# Patient Record
Sex: MALE | Employment: FULL TIME | URBAN - METROPOLITAN AREA
[De-identification: names, ages, dates, MRNs, and addresses within clinical notes are randomized per-mention and may not be internally consistent; named-entity substitution may affect disease eponyms.]

---

## 2017-02-23 ENCOUNTER — OFFICE VISIT (OUTPATIENT)
Dept: FAMILY MEDICINE | Facility: CLINIC | Age: 37
End: 2017-02-23
Payer: COMMERCIAL

## 2017-02-23 VITALS
DIASTOLIC BLOOD PRESSURE: 75 MMHG | BODY MASS INDEX: 32.18 KG/M2 | SYSTOLIC BLOOD PRESSURE: 116 MMHG | TEMPERATURE: 98.6 F | HEART RATE: 65 BPM | OXYGEN SATURATION: 98 % | WEIGHT: 205 LBS | HEIGHT: 67 IN

## 2017-02-23 DIAGNOSIS — R06.83 SNORING: ICD-10-CM

## 2017-02-23 DIAGNOSIS — Z13.6 CARDIOVASCULAR SCREENING; LDL GOAL LESS THAN 160: ICD-10-CM

## 2017-02-23 DIAGNOSIS — Z00.00 ENCOUNTER FOR ROUTINE ADULT HEALTH EXAMINATION WITHOUT ABNORMAL FINDINGS: Primary | ICD-10-CM

## 2017-02-23 DIAGNOSIS — Z83.3 FAMILY HISTORY OF DIABETES MELLITUS: ICD-10-CM

## 2017-02-23 DIAGNOSIS — N52.9 ERECTILE DYSFUNCTION, UNSPECIFIED ERECTILE DYSFUNCTION TYPE: ICD-10-CM

## 2017-02-23 PROBLEM — E66.811 OBESITY, CLASS I, BMI 30-34.9: Status: ACTIVE | Noted: 2017-02-23

## 2017-02-23 LAB
CHOLEST SERPL-MCNC: 184 MG/DL
GLUCOSE SERPL-MCNC: 97 MG/DL (ref 70–99)
HDLC SERPL-MCNC: 46 MG/DL
LDLC SERPL CALC-MCNC: 115 MG/DL
NONHDLC SERPL-MCNC: 138 MG/DL
TRIGL SERPL-MCNC: 114 MG/DL

## 2017-02-23 PROCEDURE — 82947 ASSAY GLUCOSE BLOOD QUANT: CPT | Performed by: FAMILY MEDICINE

## 2017-02-23 PROCEDURE — 99395 PREV VISIT EST AGE 18-39: CPT | Performed by: FAMILY MEDICINE

## 2017-02-23 PROCEDURE — 99212 OFFICE O/P EST SF 10 MIN: CPT | Mod: 25 | Performed by: FAMILY MEDICINE

## 2017-02-23 PROCEDURE — 80061 LIPID PANEL: CPT | Performed by: FAMILY MEDICINE

## 2017-02-23 PROCEDURE — 36415 COLL VENOUS BLD VENIPUNCTURE: CPT | Performed by: FAMILY MEDICINE

## 2017-02-23 RX ORDER — SILDENAFIL CITRATE 20 MG/1
20-100 TABLET ORAL DAILY PRN
Qty: 50 TABLET | Refills: 1 | Status: SHIPPED | OUTPATIENT
Start: 2017-02-23 | End: 2017-10-10

## 2017-02-23 ASSESSMENT — PAIN SCALES - GENERAL: PAINLEVEL: NO PAIN (0)

## 2017-02-23 NOTE — PATIENT INSTRUCTIONS
Preventive Health Recommendations  Male Ages 26 - 39    Yearly exam:             See your health care provider every year in order to  o   Review health changes.   o   Discuss preventive care.    o   Review your medicines if your doctor has prescribed any.    You should be tested each year for STDs (sexually transmitted diseases), if you re at risk.     After age 35, talk to your provider about cholesterol testing. If you are at risk for heart disease, have your cholesterol tested at least every 5 years.     If you are at risk for diabetes, you should have a diabetes test (fasting glucose).  Shots: Get a flu shot each year. Get a tetanus shot every 10 years.     Nutrition:    Eat at least 5 servings of fruits and vegetables daily.     Eat whole-grain bread, whole-wheat pasta and brown rice instead of white grains and rice.     Talk to your provider about Calcium and Vitamin D.     Lifestyle    Exercise for at least 150 minutes a week (30 minutes a day, 5 days a week). This will help you control your weight and prevent disease.     Limit alcohol to one drink per day.     No smoking.     Wear sunscreen to prevent skin cancer.     See your dentist every six months for an exam and cleaning.       ================================================================================  Normal Values   Blood pressure  <140/90 for most adults    <130/80 for some chronic diseases (ask your care team about yours)    BMI (body mass index)  18.5-25 kg/m2 (based on height and weight)     Thank you for visiting [facility]    Normal or non-critical lab and imaging results will be communicated to you by MyChart, letter or phone within 7 days.  If you do not hear from us within 10 days, please call the clinic. If you have a critical or abnormal lab result, we will notify you by phone as soon as possible.     If you have any questions regarding your visit please contact:     Team Comfort:   Clinic Hours Telephone  Number   Dr. Anshul Brock 7am-5pm  Monday - Friday [phone]  Rudy RN  Deidra Benitez RN   Pharmacy 8:00am-8pm Monday-Friday    9am-5pm Saturday-Sunday [phone]   Urgent Care 11am-9pm Monday-Friday        9am-5pm Saturday-Sunday [phone]     After hours, weekend or if you need to make an appointment with your primary provider please call [phone].   After Hours nurse advise: call [facility]: [phone]    Medication Refills:  Call your pharmacy and they will forward the refill to us. Please allow 3 business days for your refills to be completed.          Qué es la disfunción eréctil  Si usted tiene problemas para lograr o mantener rd erección, recuerde que hay muchas personas con el mismo problema. La disfunción eréctil consiste en la dificultad para lograr la erección o mantenerla ab el tiempo suficiente para realizar el acto sexual (coito). Kayley problema puede ocurrirle a hombres de cualquier edad, lisa los trastornos de lauren que pueden provocar disfunción eréctil son más comunes a medida que aumenta la edad. La mitad de los hombres mayores de 40 años tienen disfunción eréctil en algún momento.  Causas de la disfunción eréctil    La disfunción eréctil puede tener diversas causas. La mayoría de ellas son físicas, lisa algunas veces se trata de problemas emocionales. A menudo, el problema se debe a rd combinación de varias causas. Entre las causas de la disfunción eréctil se encuentran las siguientes:    Trastornos de lauren pietro diabetes o depresión    Fumar tabaco o marihuana    Beber demasiado alcohol    Efectos secundarios de medicamentos    Daños a los nervios o a los vasos sanguíneos    Trastornos emocionales pietro estrés o problemas en las relaciones personales  La disfunción eréctil tiene tratamiento  Existen medicamentos con receta para tratar la disfunción eréctil. Estos medicamentos resultan útiles  en muchos casos carolyn, según cuál sea la causa del problema, mathew vez no yaritza suficientes. En kate magdiel, es posible que haya otras alternativas de tratamiento, tales pietro instrumentos de ayuda para la erección o cirugía. Dalton proveedor de atención médica podrá darle más información acerca del tratamiento adecuado para usted. Además, se están investigando actualmente nuevos tratamientos. Independientemente del tratamiento que elija, manténgase en contacto con dalton médico. Si los síntomas continúan, es posible que el médico pueda modificar el tratamiento actual o intentar darin nuevo.    [id number] [employer]. [address] [place] [id number]. Todos los derechos reservados. Esta información no pretende sustituir la atención médica profesional. Sólo dalton médico puede diagnosticar y tratar un problema de lauren.        Medicamentos orales para la disfunción eréctil (DE)  Los medicamentos recetados suelen ser muy eficaces en tratar la DE. Carolyn los hombres con ciertos problemas de lauren o que estén tomando determinados medicamentos no deben usarlos. Además, todos los medicamentos pueden provocar efectos secundarios. Pregúntele a dalton médico si los medicamentos orales para la DE son adecuados para usted, así pietro los riesgos y beneficios de dichos medicamentos.  Tipos de medicamentos orales para la DE  En el braun existen alex tipos de medicamentos orales vendidos con receta médica. Cada darin de ellos aumenta el flujo de torsten al pene. Cuando se estimula el pene, el resultado es rd erección. Estos alex medicamentos son:    Citrato de sildenafilo (Viagra)    Tadalafilo (Cialis)    Clorohidrato de vardenafilo (Levitra)  Lo que no hacen los medicamentos orales para la DE  No curan la disfunción eréctil. Si ellen de parish el medicamento, usted seguirá teniendo dificultades para lograr rd erección.    No producen rd erección en ausencia de estimulación sexual.    No aumentan la libido ni resuelven ningún otro  problema sexual aparte de las dificultades para lograr rd erección. Tampoco resuelven ningún tipo de problemas de tipo psicológico, emocional o de relaciones de sendy.  Cómo parish de manera hector los medicamentos orales para la DE    No combine los medicamentos para la DE con otros tratamientos a menos que dalton médico le sugiera hacerlo.    No tome medicamentos para la DE con mayor frecuencia o en dosis más elevadas que lo indicado en la receta.    Mencione a dalton médico desiree antecedentes clínicos y todos los medicamentos que tome, incluyendo los albin vendidos sin receta, los suplementos y las hierbas medicinales.    Consulte con dalton médico sobre los efectos de combinar el alcohol con dalton medicamento.  Posibles efectos secundarios de los medicamentos orales para la DE    Dolor de laurence    Rubor facial    Moqueo o congestión nasal    Indigestión    Distorsión pasajera de la percepción visual de los colores    Pérdida repentina de la visión o del oído (raramente)  Riesgos de los medicamentos orales para la DE    Usted no debe parish medicamentos para la DE si lucia otros medicamentos que contengan nitratos. Esta combinación puede provocar un descenso de la presión arterial a niveles peligrosos. Los nitratos incluyen la nitroglicerina (un tratamiento para la angina) y los  poppers  (un droga recreacional inhalada). Si no está seguro acerca de si está tomando nitratos, pregúntele a dalton médico o farmacéutico.    Los medicamentos llamados alfabloqueantes pueden interactuar con los medicamentos para la DE y provocar un descenso repentino de la presión arterial. Los alfabloqueantes son un tratamiento común para los problemas de la próstata y la patricia presión arterial. Asegúrese de que dalton médico se entere si usted está tomando estos medicamentos.     Si tiene el corazón debilitado debido a un infarto o rd enfermedad cardíaca, y no ha tenido relaciones sexuales ab algún tiempo, reanudarlas podría aumentar el esfuerzo que  debe hacer dalton corazón. Antes de tener relaciones sexuales, consulte con dalton médico para averiguar si dalton corazón está lo suficientemente raji pietro para tolerarlas.    En raros casos podría presentarse rd pérdida repentina de la visión al parish medicamentos para la DE. Halsey puede estar relacionado con presión arterial patricia, diabetes y otros trastornos. Pregúntele a dalton médico si usted está en riesgo de cecille tipo de pérdida de visión.    En raras ocasiones, la erección podría durar demasiado tiempo. Halsey podría dañar severamente los vasos sanguíneos del pene. Si dalton erección dura más de 4 horas, vaya de inmediato a la kain de emergencias.     [id number] [employer]. [address] [place] [address]. Todos los derechos reservados. Esta información no pretende sustituir la atención médica profesional. Sólo dalton médico puede diagnosticar y tratar un problema de lauren.        Understanding Erectile Dysfunction  Erectile dysfunction (ED) is a problem getting an erection firm enough or keeping it long enough for intercourse. The problem can happen to any man at any age. But health problems that can lead to ED become more common as a man ages. Up to half of men over age 40 experience ED at some point.    Causes of ED  ED can have many causes. Most are physical. Some are emotional issues. Often, a combination of causes is involved. Causes of ED may include:    Medical conditions such as diabetes or depression    Smoking tobacco or marijuana    Drinking too much alcohol    Side effects of medications    Injury to nerves or blood vessels    Emotional issues such as stress or relationship problems  ED can be treated  Prescription medications for ED are available. They help many men who try them. Depending upon the cause of the ED, though, medications may not be enough. In these cases, other treatment options are available. These include erectile aids and surgery. Your health care provider can tell you more about  the treatment that is right for you. And new treatments for ED are being studied. No matter what the treatment you decide on, stay in touch with your doctor. If your symptoms persist, he or she may be able to adjust your current treatment or try something new.    3479-9788 [employer]. [address] [place] [address]. All rights reserved. This information is not intended as a substitute for professional medical care. Always follow your healthcare professional's instructions.        Oral Medications for Erectile Dysfunction  You can use prescription oral medications for ED. They often work well. But, like all medications, they can have side effects. Also, you can t use them if you have certain health problems or take certain other medications. Talk with your doctor about oral ED medication. Ask whether it is right for you.  Types of oral ED medications  There are three types of prescription oral ED medications. Each one increases blood flow to the penis. When the penis is stimulated, an erection results. The three types are:    Sildenafil citrate (Viagra)    Tadalafil (Cialis)    Vardenafil HCl (Levitra)  What oral ED medications don t do  There are some things ED medications can t do.    They don t cure the cause of your ED. Without the medication, you ll still have trouble getting an erection.    They can t produce an erection without sexual stimulation.    They won t increase sexual desire. They also won t solve any other sexual issues. Psychological, emotional, or relationship issues will not be fixed.  Taking oral ED medications safely    Do not combine ED medications with other treatments unless your doctor tells you to.    Don t take ED medications more often or in larger doses than prescribed.    Tell your doctor your health history. Mention all medications you take. This includes over-the-counter drugs, supplements, and herbs.    Ask your doctor about whether you can drink alcohol  while taking ED medication.  Possible side effects of oral ED medications    Headache    Facial flushing    Runny or stuffy nose    Indigestion    Distortion of your color vision for a short time    Sudden vision loss or hearing loss (rare)  Risks of oral ED medications    Do not take ED medications if you take medications containing nitrates. The combination may drop your blood pressure to a dangerous level. Nitrates include nitroglycerin (a drug for angina). They are also in  poppers,  an inhaled recreational drug. If you re not sure whether you re taking nitrates, ask your doctor or pharmacist.    Medications called alpha-blockers can interact with ED medications. They can cause a sudden drop in blood pressure. Alpha-blockers are a common treatment for prostate problems. They also treat high blood pressure. Be sure your doctor knows if you take these medications.    If you ve had a heart attack or have heart disease and you have not had sex for a while, talk to your doctor. Having sex again can put extra strain on your heart. Your doctor can confirm that your heart is healthy enough for sex.    It is rare, but some men taking ED medications have had sudden vision loss. This may be more likely if other health problems are present. These include high blood pressure and diabetes. Ask your doctor if you are at risk for this type of vision loss.    In rare cases, an erection may last too long. This can badly damage the blood vessels in your penis. If an erection lasts longer than 4 hours, go to the emergency room right away.        9965-5995 [employer]. [address] [place] [address], [place] [address]. All rights reserved. This information is not intended as a substitute for professional medical care. Always follow your healthcare professional's instructions.

## 2017-02-23 NOTE — PROGRESS NOTES
SUBJECTIVE:     CC: Edmund Marcos is an 36 year old male who presents for preventative health visit.     Healthy Habits:    Do you get at least three servings of calcium containing foods daily (dairy, green leafy vegetables, etc.)? yes    Amount of exercise or daily activities, outside of work: none    Problems taking medications regularly: not applicable    Medication side effects: No    Have you had an eye exam in the past two years? no    Do you see a dentist twice per year? Yes    Do you have sleep apnea, excessive snoring or daytime drowsiness? yes      Other concerns:  -For the past couple years, patient reports he has been snoring at night which makes him tired during the day. He notes he is interested in seeing someone about it.       Today's PHQ-2 Score:   PHQ-2 ( 1999 [employer]) 2/23/2017 10/18/2012   Q1: Little interest or pleasure in doing things 0 0   Q2: Feeling down, depressed or hopeless 0 0   PHQ-2 Score 0 0       Abuse: Current or Past(Physical, Sexual or Emotional)- NO  Do you feel safe in your environment - YES    Social History   Substance Use Topics     Smoking status: Never Smoker     Smokeless tobacco: Never Used     Alcohol use Yes      Comment: occasional, 1-2 beers     The patient does not drink >3 drinks per day nor >7 drinks per week.    Past medical, family, and social histories, medications, and allergies are reviewed and updated in Epic.     ROS:  C: NEGATIVE for fever, chills, change in weight  I: NEGATIVE for worrisome rashes, moles or lesions  E: NEGATIVE for vision changes or irritation  ENT: NEGATIVE for ear, mouth and throat problems  R: NEGATIVE for significant cough or SOB  CV: NEGATIVE for chest pain, palpitations or peripheral edema  GI: NEGATIVE for nausea, abdominal pain, heartburn, or change in bowel habits   male: negative for dysuria, hematuria, decreased urinary stream, urethral discharge. Patient reports erectile dysfunction for the past year. He notes  "difficulty keeping an erection.  M: NEGATIVE for significant arthralgias or myalgia  N: NEGATIVE for weakness, dizziness or paresthesias  P: NEGATIVE for changes in mood or affect      Any history above obtained by the Medical Assistant was reviewed by Dr. Anshul Garland MD, and edited when necessary.    This document serves as a record of the services and decisions personally performed and made by Dr. Garland. It was created on his behalf by Genevieve Hopson, a trained medical scribe. The creation of this document is based on the provider's statements to the medical scribe.  Genevieve Hopson February 23, 2017 10:59 AM   OBJECTIVE:     /75 (BP Location: Left arm, Patient Position: Chair, Cuff Size: Adult Large)  Pulse 65  Temp 98.6  F (37  C) (Oral)  Ht 5' 7\" (1.702 m)  Wt 205 lb (93 kg)  SpO2 98%  BMI 32.11 kg/m2  EXAM:  GENERAL: healthy, alert and no distress  EYES: Eyes grossly normal to inspection, PERRL and conjunctivae and sclerae normal  HENT: ear canals and TM's normal, nose and mouth without ulcers or lesions  NECK: no adenopathy, no asymmetry, masses, or scars and thyroid normal to palpation  RESP: lungs clear to auscultation - no rales, rhonchi or wheezes  CV: regular rate and rhythm, normal S1 S2, no S3 or S4, no murmur, click or rub, no peripheral edema and peripheral pulses strong  ABDOMEN: soft, nontender, no hepatosplenomegaly, no masses and bowel sounds normal   (male): normal male genitalia without lesions or urethral discharge, no hernia  MS: no gross musculoskeletal defects noted, no edema  SKIN: no suspicious lesions or rashes  NEURO: Normal strength and tone, mentation intact and speech normal, DTR's symmetrical, cranial nerves 2-12 intact   PSYCH: mentation appears normal, affect normal/bright    ASSESSMENT/PLAN:     (Z00.00) Encounter for routine adult health examination without abnormal findings  (primary encounter diagnosis)  Comment: Negative screening exam; up-to-date on preventive " "services. Asymptomatic STD screening offered and declined by the patient.  Plan: Lipid panel reflex to direct LDL, Glucose        Follow up in 1 year for ANDREW    (Z13.6) CARDIOVASCULAR SCREENING; LDL GOAL LESS THAN 160  Comment: fasting.   Plan: Lipid panel reflex to direct LDL        Monitor periodically     (Z83.3) Family history of diabetes mellitus  Comment: mother and sister. Fasting.   Plan: Glucose, JUST IN CASE            (R06.83) Snoring  Comment: with his EDS, he likely has LOUIS  Plan: SLEEP EVALUATION & MANAGEMENT REFERRAL - ADULT            (N52.9) Erectile dysfunction, unspecified erectile dysfunction type  Comment:   Plan: sildenafil (REVATIO/VIAGRA) 20 MG tablet        Handouts provided. Follow up prn.       COUNSELING:  Reviewed preventive health counseling, as reflected in patient instructions       Regular exercise       Dental care       reports that he has never smoked. He has never used smokeless tobacco.    Estimated body mass index is 32.11 kg/(m^2) as calculated from the following:    Height as of this encounter: 5' 7\" (1.702 m).    Weight as of this encounter: 205 lb (93 kg).   Weight management plan: Discussed healthy diet and exercise guidelines and patient will follow up in 12 months in clinic to re-evaluate. Patient reports he rides his bike or occasionally goes to the gym.      Counseling Resources:  ATP IV Guidelines  Pooled Cohorts Equation Calculator  FRAX Risk Assessment  ICSI Preventive Guidelines  Dietary Guidelines for Americans, 2010  USDA's MyPlate  ASA Prophylaxis  Lung CA Screening      The information in this document, created by the medical scribe for me, accurately reflects the services I personally performed and the decisions made by me. I have reviewed and approved this document for accuracy prior to leaving the patient care area.  Anshul Garland MD    "

## 2017-02-23 NOTE — MR AVS SNAPSHOT
After Visit Summary   2/23/2017    Edmund Marcos    MRN: [id number]           Patient Information     Date Of Birth          1980        Visit Information        Provider Department      2/23/2017 11:00 AM Anshul Garland MD [facility]        Today's Diagnoses     Encounter for routine adult health examination without abnormal findings    -  1    CARDIOVASCULAR SCREENING; LDL GOAL LESS THAN 160        Family history of diabetes mellitus        Snoring        Erectile dysfunction, unspecified erectile dysfunction type          Care Instructions      Preventive Health Recommendations  Male Ages 26 - 39    Yearly exam:             See your health care provider every year in order to  o   Review health changes.   o   Discuss preventive care.    o   Review your medicines if your doctor has prescribed any.    You should be tested each year for STDs (sexually transmitted diseases), if you re at risk.     After age 35, talk to your provider about cholesterol testing. If you are at risk for heart disease, have your cholesterol tested at least every 5 years.     If you are at risk for diabetes, you should have a diabetes test (fasting glucose).  Shots: Get a flu shot each year. Get a tetanus shot every 10 years.     Nutrition:    Eat at least 5 servings of fruits and vegetables daily.     Eat whole-grain bread, whole-wheat pasta and brown rice instead of white grains and rice.     Talk to your provider about Calcium and Vitamin D.     Lifestyle    Exercise for at least 150 minutes a week (30 minutes a day, 5 days a week). This will help you control your weight and prevent disease.     Limit alcohol to one drink per day.     No smoking.     Wear sunscreen to prevent skin cancer.     See your dentist every six months for an exam and cleaning.       ================================================================================  Normal Values   Blood pressure  <140/90 for most  adults    <130/80 for some chronic diseases (ask your care team about yours)    BMI (body mass index)  18.5-25 kg/m2 (based on height and weight)     Thank you for visiting [facility]    Normal or non-critical lab and imaging results will be communicated to you by MyChart, letter or phone within 7 days.  If you do not hear from us within 10 days, please call the clinic. If you have a critical or abnormal lab result, we will notify you by phone as soon as possible.     If you have any questions regarding your visit please contact:     Team Comfort:   Clinic Hours Telephone Number   Dr. Anshul Saucedo Dr. [place] 7am-5pm  Monday - Friday [phone]  Rudy Benitez RN   Pharmacy 8:00am-8pm Monday-Friday    9am-5pm Saturday-Sunday [phone]   Urgent Care 11am-9pm Monday-Friday        9am-5pm Saturday-Sunday [phone]     After hours, weekend or if you need to make an appointment with your primary provider please call [phone].   After Hours nurse advise: call [facility]: [phone]    Medication Refills:  Call your pharmacy and they will forward the refill to us. Please allow 3 business days for your refills to be completed.          Qué es la disfunción eréctil  Si usted tiene problemas para lograr o mantener rd erección, recuerde que hay muchas personas con el mismo problema. La disfunción eréctil consiste en la dificultad para lograr la erección o mantenerla ab el tiempo suficiente para realizar el acto sexual (coito). Kayley problema puede ocurrirle a hombres de cualquier edad, lisa los trastornos de lauren que pueden provocar disfunción eréctil son más comunes a medida que aumenta la edad. La mitad de los hombres mayores de 40 años tienen disfunción eréctil en algún momento.  Causas de la disfunción eréctil    La disfunción eréctil puede tener diversas causas. La mayoría de ellas son físicas, lisa  algunas veces se trata de problemas emocionales. A menudo, el problema se debe a rd combinación de varias causas. Entre las causas de la disfunción eréctil se encuentran las siguientes:    Trastornos de lauren pietro diabetes o depresión    Fumar tabaco o marihuana    Beber demasiado alcohol    Efectos secundarios de medicamentos    Daños a los nervios o a los vasos sanguíneos    Trastornos emocionales pietro estrés o problemas en las relaciones personales  La disfunción eréctil tiene tratamiento  Existen medicamentos con receta para tratar la disfunción eréctil. Estos medicamentos resultan útiles en muchos casos carolyn, según cuál sea la causa del problema, mathew vez no yaritza suficientes. En kate magdiel, es posible que haya otras alternativas de tratamiento, tales pietro instrumentos de ayuda para la erección o cirugía. Dalton proveedor de atención médica podrá darle más información acerca del tratamiento adecuado para usted. Además, se están investigando actualmente nuevos tratamientos. Independientemente del tratamiento que elija, manténgase en contacto con dalton médico. Si los síntomas continúan, es posible que el médico pueda modificar el tratamiento actual o intentar darin nuevo.    1493-6270 [employer]. [address] [place] [address]. Todos los derechos reservados. Esta información no pretende sustituir la atención médica profesional. Sólo dalton médico puede diagnosticar y tratar un problema de lauren.        Medicamentos orales para la disfunción eréctil (DE)  Los medicamentos recetados suelen ser muy eficaces en tratar la DE. Carolyn los hombres con ciertos problemas de lauren o que estén tomando determinados medicamentos no deben usarlos. Además, todos los medicamentos pueden provocar efectos secundarios. Pregúntele a dalton médico si los medicamentos orales para la DE son adecuados para usted, así pietro los riesgos y beneficios de dichos medicamentos.  Tipos de medicamentos orales para la DE  En el braun existen  alex tipos de medicamentos orales vendidos con receta médica. Cada darin de ellos aumenta el flujo de torsten al pene. Cuando se estimula el pene, el resultado es rd erección. Estos alex medicamentos son:    Citrato de sildenafilo (Viagra)    Tadalafilo (Cialis)    Clorohidrato de vardenafilo (Levitra)  Lo que no hacen los medicamentos orales para la DE  No curan la disfunción eréctil. Si ellen de parish el medicamento, usted seguirá teniendo dificultades para lograr rd erección.    No producen rd erección en ausencia de estimulación sexual.    No aumentan la libido ni resuelven ningún otro problema sexual aparte de las dificultades para lograr rd erección. Tampoco resuelven ningún tipo de problemas de tipo psicológico, emocional o de relaciones de sendy.  Cómo parish de manera hector los medicamentos orales para la DE    No combine los medicamentos para la DE con otros tratamientos a menos que dalton médico le sugiera hacerlo.    No tome medicamentos para la DE con mayor frecuencia o en dosis más elevadas que lo indicado en la receta.    Mencione a dalton médico desiree antecedentes clínicos y todos los medicamentos que tome, incluyendo los albin vendidos sin receta, los suplementos y las hierbas medicinales.    Consulte con dalton médico sobre los efectos de combinar el alcohol con dalton medicamento.  Posibles efectos secundarios de los medicamentos orales para la DE    Dolor de laurence    Rubor facial    Moqueo o congestión nasal    Indigestión    Distorsión pasajera de la percepción visual de los colores    Pérdida repentina de la visión o del oído (raramente)  Riesgos de los medicamentos orales para la DE    Usted no debe parish medicamentos para la DE si lucia otros medicamentos que contengan nitratos. Esta combinación puede provocar un descenso de la presión arterial a niveles peligrosos. Los nitratos incluyen la nitroglicerina (un tratamiento para la angina) y los  poppers  (un droga recreacional inhalada). Si no está seguro  acerca de si está tomando nitratos, pregúntele a dalton médico o farmacéutico.    Los medicamentos llamados alfabloqueantes pueden interactuar con los medicamentos para la DE y provocar un descenso repentino de la presión arterial. Los alfabloqueantes son un tratamiento común para los problemas de la próstata y la patricia presión arterial. Asegúrese de que dalton médico se entere si usted está tomando estos medicamentos.     Si tiene el corazón debilitado debido a un infarto o rd enfermedad cardíaca, y no ha tenido relaciones sexuales ab algún tiempo, reanudarlas podría aumentar el esfuerzo que debe hacer dalton corazón. Antes de tener relaciones sexuales, consulte con dalton médico para averiguar si dalton corazón está lo suficientemente raji pietro para tolerarlas.    En raros casos podría presentarse rd pérdida repentina de la visión al parish medicamentos para la DE. Wing puede estar relacionado con presión arterial patricia, diabetes y otros trastornos. Pregúntele a dalton médico si usted está en riesgo de cecille tipo de pérdida de visión.    En raras ocasiones, la erección podría durar demasiado tiempo. Wing podría dañar severamente los vasos sanguíneos del pene. Si dalton erección dura más de 4 horas, vaya de inmediato a la kain de emergencias.     [id number] [employer]. [address] [place] [address], [place] [address]. Todos los derechos reservados. Esta información no pretende sustituir la atención médica profesional. Sólo dalton médico puede diagnosticar y tratar un problema de lauren.        Understanding Erectile Dysfunction  Erectile dysfunction (ED) is a problem getting an erection firm enough or keeping it long enough for intercourse. The problem can happen to any man at any age. But health problems that can lead to ED become more common as a man ages. Up to half of men over age 40 experience ED at some point.    Causes of ED  ED can have many causes. Most are physical. Some are emotional issues. Often, a combination of causes is  involved. Causes of ED may include:    Medical conditions such as diabetes or depression    Smoking tobacco or marijuana    Drinking too much alcohol    Side effects of medications    Injury to nerves or blood vessels    Emotional issues such as stress or relationship problems  ED can be treated  Prescription medications for ED are available. They help many men who try them. Depending upon the cause of the ED, though, medications may not be enough. In these cases, other treatment options are available. These include erectile aids and surgery. Your health care provider can tell you more about the treatment that is right for you. And new treatments for ED are being studied. No matter what the treatment you decide on, stay in touch with your doctor. If your symptoms persist, he or she may be able to adjust your current treatment or try something new.    2708-8175 [employer]. [address] [place] [address], [place] [address]. All rights reserved. This information is not intended as a substitute for professional medical care. Always follow your healthcare professional's instructions.        Oral Medications for Erectile Dysfunction  You can use prescription oral medications for ED. They often work well. But, like all medications, they can have side effects. Also, you can t use them if you have certain health problems or take certain other medications. Talk with your doctor about oral ED medication. Ask whether it is right for you.  Types of oral ED medications  There are three types of prescription oral ED medications. Each one increases blood flow to the penis. When the penis is stimulated, an erection results. The three types are:    Sildenafil citrate (Viagra)    Tadalafil (Cialis)    Vardenafil HCl (Levitra)  What oral ED medications don t do  There are some things ED medications can t do.    They don t cure the cause of your ED. Without the medication, you ll still have trouble getting an erection.    They  can t produce an erection without sexual stimulation.    They won t increase sexual desire. They also won t solve any other sexual issues. Psychological, emotional, or relationship issues will not be fixed.  Taking oral ED medications safely    Do not combine ED medications with other treatments unless your doctor tells you to.    Don t take ED medications more often or in larger doses than prescribed.    Tell your doctor your health history. Mention all medications you take. This includes over-the-counter drugs, supplements, and herbs.    Ask your doctor about whether you can drink alcohol while taking ED medication.  Possible side effects of oral ED medications    Headache    Facial flushing    Runny or stuffy nose    Indigestion    Distortion of your color vision for a short time    Sudden vision loss or hearing loss (rare)  Risks of oral ED medications    Do not take ED medications if you take medications containing nitrates. The combination may drop your blood pressure to a dangerous level. Nitrates include nitroglycerin (a drug for angina). They are also in  poppers,  an inhaled recreational drug. If you re not sure whether you re taking nitrates, ask your doctor or pharmacist.    Medications called alpha-blockers can interact with ED medications. They can cause a sudden drop in blood pressure. Alpha-blockers are a common treatment for prostate problems. They also treat high blood pressure. Be sure your doctor knows if you take these medications.    If you ve had a heart attack or have heart disease and you have not had sex for a while, talk to your doctor. Having sex again can put extra strain on your heart. Your doctor can confirm that your heart is healthy enough for sex.    It is rare, but some men taking ED medications have had sudden vision loss. This may be more likely if other health problems are present. These include high blood pressure and diabetes. Ask your doctor if you are at risk for this type  of vision loss.    In rare cases, an erection may last too long. This can badly damage the blood vessels in your penis. If an erection lasts longer than 4 hours, go to the emergency room right away.        9970-2017 [employer]. [address] [place] [address]. All rights reserved. This information is not intended as a substitute for professional medical care. Always follow your healthcare professional's instructions.              Follow-ups after your visit        Additional Services     SLEEP EVALUATION & MANAGEMENT REFERRAL - ADULT       Please call [facility] at [phone] to schedule your appointment with the sleep specialist.     Please be aware that coverage of these services is subject to the terms and limitations of your health insurance plan.  Call member services at your health plan with any benefit or coverage questions.      Please bring the following to your appointment:    >>   List of current medications   >>   This referral request   >>   Any documents/labs given to you for this referral                  Follow-up notes from your care team     Return if symptoms worsen or fail to improve.      Future tests that were ordered for you today     Open Future Orders        Priority Expected Expires Ordered    SLEEP EVALUATION & MANAGEMENT REFERRAL - ADULT Routine  2/23/2018 2/23/2017            Who to contact     If you have questions or need follow up information about today's clinic visit or your schedule please contact [facility] directly at [phone].  Normal or non-critical lab and imaging results will be communicated to you by MyChart, letter or phone within 4 business days after the clinic has received the results. If you do not hear from us within 7 days, please contact the clinic through MyChart or phone. If you have a critical or abnormal lab result, we will notify you by phone as soon as possible.  Submit refill requests through  "MyChart or call your pharmacy and they will forward the refill request to us. Please allow 3 business days for your refill to be completed.          Additional Information About Your Visit        MyChart Information     [employer] lets you send messages to your doctor, view your test results, renew your prescriptions, schedule appointments and more. To sign up, go to [email or web address] . Click on \"Log in\" on the left side of the screen, which will take you to the Welcome page. Then click on \"Sign up Now\" on the right side of the page.     You will be asked to enter the access code listed below, as well as some personal information. Please follow the directions to create your username and password.     Your access code is: [email or web address]  Expires: 2017 11:50 AM     Your access code will  in 90 days. If you need help or a new code, please call your [facility] clinic or [phone].        Care EveryWhere ID     This is your Care EveryWhere ID. This could be used by other organizations to access your [facility] medical records  [id number]        Your Vitals Were     Pulse Temperature Height Pulse Oximetry BMI (Body Mass Index)       65 98.6  F (37  C) (Oral) 5' 7\" (1.702 m) 98% 32.11 kg/m2        Blood Pressure from Last 3 Encounters:   17 116/75   10/18/12 107/70    Weight from Last 3 Encounters:   17 205 lb (93 kg)   10/18/12 198 lb (89.8 kg)              We Performed the Following     Glucose     JUST IN CASE     Lipid panel reflex to direct LDL          Today's Medication Changes          These changes are accurate as of: 17 11:50 AM.  If you have any questions, ask your nurse or doctor.               Start taking these medicines.        Dose/Directions    sildenafil 20 MG tablet   Commonly known as:  REVATIO/VIAGRA   Used for:  Erectile dysfunction, unspecified erectile dysfunction type   Started by:  Anshul Garland MD        Dose:   mg   Take 1-5 tablets ( mg) by mouth " daily as needed , as directed, 1-3 hours before intimacy. Maximum 1 dose per 24 hours. Never use with nitroglycerin, terazosin or doxazosin.   Quantity:  50 tablet   Refills:  1            Where to get your medicines      Some of these will need a paper prescription and others can be bought over the counter.  Ask your nurse if you have questions.     Bring a paper prescription for each of these medications     sildenafil 20 MG tablet                Primary Care Provider    None Specified       No primary provider on file.        Thank you!     Thank you for choosing [facility]  for your care. Our goal is always to provide you with excellent care. Hearing back from our patients is one way we can continue to improve our services. Please take a few minutes to complete the written survey that you may receive in the mail after your visit with us. Thank you!             Your Updated Medication List - Protect others around you: Learn how to safely use, store and throw away your medicines at www.disposemymeds.org.          This list is accurate as of: 2/23/17 11:50 AM.  Always use your most recent med list.                   Brand Name Dispense Instructions for use    sildenafil 20 MG tablet    REVATIO/VIAGRA    50 tablet    Take 1-5 tablets ( mg) by mouth daily as needed , as directed, 1-3 hours before intimacy. Maximum 1 dose per 24 hours. Never use with nitroglycerin, terazosin or doxazosin.

## 2017-02-23 NOTE — NURSING NOTE
"Chief Complaint   Patient presents with     Physical       Initial /75 (BP Location: Left arm, Patient Position: Chair, Cuff Size: Adult Large)  Pulse 65  Temp 98.6  F (37  C) (Oral)  Ht 5' 7\" (1.702 m)  Wt 205 lb (93 kg)  SpO2 98%  BMI 32.11 kg/m2 Estimated body mass index is 32.11 kg/(m^2) as calculated from the following:    Height as of this encounter: 5' 7\" (1.702 m).    Weight as of this encounter: 205 lb (93 kg).  Medication Reconciliation: complete   IVONE Lin MA      "

## 2017-02-23 NOTE — LETTER
[facility]  [address]  [place] [address]      February 28, 2017      Edmund Marcos  [address]  [facility] [place] [address]            Dear Edmund Marcos    All of your labs were normal for you. Todas shilpa lyonss son normales.      Enclosed is a copy of the results.  It was a pleasure to see you at your last appointment.    If you have any questions or concerns, please call myself or my nurse at [phone].      Sincerely,      Anshul Garland MD/CAROLINE Butt MA      Results for orders placed or performed in visit on 02/23/17   Lipid panel reflex to direct LDL   Result Value Ref Range    Cholesterol 184 <200 mg/dL    Triglycerides 114 <150 mg/dL    HDL Cholesterol 46 >39 mg/dL    LDL Cholesterol Calculated 115 (H) <100 mg/dL    Non HDL Cholesterol 138 (H) <130 mg/dL   Glucose   Result Value Ref Range    Glucose 97 70 - 99 mg/dL

## 2017-03-28 ENCOUNTER — OFFICE VISIT (OUTPATIENT)
Dept: SLEEP MEDICINE | Facility: CLINIC | Age: 37
End: 2017-03-28
Payer: COMMERCIAL

## 2017-03-28 VITALS
SYSTOLIC BLOOD PRESSURE: 116 MMHG | HEIGHT: 67 IN | OXYGEN SATURATION: 96 % | HEART RATE: 81 BPM | WEIGHT: 211.4 LBS | BODY MASS INDEX: 33.18 KG/M2 | DIASTOLIC BLOOD PRESSURE: 75 MMHG

## 2017-03-28 DIAGNOSIS — R06.89 DYSPNEA AND RESPIRATORY ABNORMALITY: Primary | ICD-10-CM

## 2017-03-28 DIAGNOSIS — G47.9 DISTURBANCE IN SLEEP BEHAVIOR: ICD-10-CM

## 2017-03-28 DIAGNOSIS — G47.10 ORGANIC HYPERSOMNIA: ICD-10-CM

## 2017-03-28 DIAGNOSIS — R06.00 DYSPNEA AND RESPIRATORY ABNORMALITY: Primary | ICD-10-CM

## 2017-03-28 PROBLEM — E66.09 NON MORBID OBESITY DUE TO EXCESS CALORIES: Chronic | Status: ACTIVE | Noted: 2017-03-28

## 2017-03-28 PROCEDURE — 99204 OFFICE O/P NEW MOD 45 MIN: CPT | Performed by: INTERNAL MEDICINE

## 2017-03-28 RX ORDER — IBUPROFEN 200 MG
600 TABLET ORAL EVERY 4 HOURS PRN
COMMUNITY
End: 2017-10-10

## 2017-03-28 NOTE — PROGRESS NOTES
Sleep Consultation:    Date on this visit: 3/28/2017      Primary Physician: Fred Montes     Chief Complaint   Patient presents with     Consult     I snore a lot sleeping         Edmund goes to bed at 12:30 AM during the week. He gets up at 7:30 AM without an alarm. 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 Edmund denies difficulty falling asleep.  He wakes up 2-4 times a night for 5 minutes before falling back to sleep.  Edmund wakes up to choking, dry mouth.  On weekends, Edmund goes to bed at 9-10 AM.  He wakes up at 7:00 AM without an alarm.     Patient does not use electronics in bed and watch TV in bed.     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 Patient does have a regular bed partner. There is report of choking.  He does have witnessed apneas. They never sleep separately.  Patient sleeps on his back < side. He has occasional morning headaches, denies no restless legs.     Edmund denies any sleep walking, sleep talking, dream enactment, sleep paralysis, cataplexy and hypnogogic/hypnopompic hallucinations.    Edmund has occasional reflux at night.      Patient describes themself as a night person. Patient's [place] Sleepiness score 21/24 consistent with excessive  daytime sleepiness.      Edmund naps 1-2 times per week for 20-30 minutes. He takes some inadvertant naps.  He denies dozing while driving. Patient was counseled on the importance of driving while alert, to pull over if drowsy, or nap before getting into the vehicle if sleepy.  He uses infrequent caffeine.     Allergies:    No Known Allergies    Medications:    Current Outpatient Prescriptions   Medication Sig Dispense Refill     ibuprofen (ADVIL/MOTRIN) 200 MG tablet Take 600 mg by mouth every 4 hours as needed for mild pain         Problem List:  Patient Active Problem List    Diagnosis Date Noted     Non morbid obesity due to excess calories 03/28/2017     Priority: Low     CARDIOVASCULAR  SCREENING; LDL GOAL LESS THAN 160 10/31/2010     Priority: Low        Past Medical/Surgical History:  No past medical history on file.  Past Surgical History:   Procedure Laterality Date     CARPAL TUNNEL RELEASE RT/LT Right 2015       Social History:  Social History     Social History     Marital status: Single     Spouse name: N/A     Number of children: N/A     Years of education: N/A     Occupational History           [employer]     Social History Main Topics     Smoking status: Never Smoker     Smokeless tobacco: Not on file     Alcohol use No     Drug use: No     Sexual activity: Yes     Partners: Female     Other Topics Concern     Not on file     Social History Narrative       Family History:  Family History   Problem Relation Age of Onset     DIABETES Mother      DIABETES Sister      Coronary Artery Disease No family hx of      Colon Cancer No family hx of        Review of Systems:  A complete review of systems reviewed by me is negative with the exeption of what has been mentioned in the history of present illness.  CONSTITUTIONAL: NEGATIVE for weight gain/loss, fever, chills, sweats or night sweats, drug allergies.  EYES: NEGATIVE for changes in vision, blind spots, double vision.  ENT: NEGATIVE for ear pain, sore throat, sinus pain, post-nasal drip, runny nose, bloody nose  CARDIAC: NEGATIVE for fast heartbeats or fluttering in chest, chest pain or pressure, breathlessness when lying flat, swollen legs or swollen feet.  NEUROLOGIC:  POSITIVE for  headaches and weakness or numbness in the arms or legs  DERMATOLOGIC: NEGATIVE for rashes, new moles or change in mole(s)  PULMONARY:  POSITIVE for  SOB with activity and wheezing   GASTROINTESTINAL:  POSITIVE for  abdominal pain  GENITOURINARY: NEGATIVE for pain during urination, blood in urine, urinating more frequently than usual, irregular menstrual periods.  MUSCULOSKELETAL:  POSITIVE for  muscle pain and bone or joint  "pain  ENDOCRINE: NEGATIVE for increased thirst or urination, diabetes.  LYMPHATIC: NEGATIVE for swollen lymph nodes, lumps or bumps in the breasts or nipple discharge.    Physical Examination:  Vitals: /75  Pulse 81  Ht 1.702 m (5' 7\")  Wt 95.9 kg (211 lb 6.4 oz)  SpO2 96%  BMI 33.11 kg/m2  BMI= Body mass index is 33.11 kg/(m^2).    [place] Total Score 3/28/2017   Total score - [place] 21     GENERAL APPEARANCE: healthy, alert and no distress  EYES: Eyes grossly normal to inspection and conjunctivae and sclerae normal  HENT: ear canals and TM's normal, nose and mouth without ulcers or lesions, oropharynx crowded and tonsillar hypertrophy  NECK: no adenopathy, no asymmetry, masses, or scars and thyroid normal to palpation  RESP: lungs clear to auscultation - no rales, rhonchi or wheezes  CV: regular rates and rhythm, normal S1 S2, no S3 or S4 and no murmur, click or rub  ABDOMEN: soft, nontender, without hepatosplenomegaly or masses and bowel sounds normal  MS: extremities normal- no gross deformities noted  SKIN: no suspicious lesions or rashes  NEURO: Normal strength and tone, mentation intact, speech normal and cranial nerves 2-12 intact  PSYCH: mentation appears normal and affect normal/bright  Mallampati Class: IV.  Tonsillar Stage: 3  extending beyond pillars.    Impression/Plan:    Loud snoring, witnessed apneas, excessive daytime sleepiness (ESS 21), occasional morning headaches, frequent awakenings/choke arousals, narrowed oropharynx, obesity. Patient appears to be a good candidate for Home Sleep Test STOPBANG 5    Literature provided regarding sleep apnea.      He will follow up with me in approximately two weeks after his sleep study has been competed to review the results and discuss plan of care.       Polysomnography reviewed.  Obstructive sleep apnea reviewed.  Complications of untreated sleep apnea were reviewed.    Scotty Peraza     CC: No ref. provider found        "

## 2017-03-28 NOTE — NURSING NOTE
"Chief Complaint   Patient presents with     Consult     I snore a lot sleeping       Initial /75  Pulse 81  Ht 1.702 m (5' 7\")  Wt 95.9 kg (211 lb 6.4 oz)  SpO2 96%  BMI 33.11 kg/m2 Estimated body mass index is 33.11 kg/(m^2) as calculated from the following:    Height as of this encounter: 1.702 m (5' 7\").    Weight as of this encounter: 95.9 kg (211 lb 6.4 oz).  Medication Reconciliation: complete   Neck Cir (cm): 44 cm (3/28/2017  1:00 PM)  ESS 21    Savanna Ugalde CMA      "

## 2017-03-28 NOTE — PATIENT INSTRUCTIONS

## 2017-03-28 NOTE — MR AVS SNAPSHOT
After Visit Summary   3/28/2017    Edmund Torres    MRN: [id number]           Patient Information     Date Of Birth          1980        Visit Information        Provider Department      3/28/2017 1:00 PM Scotty Peraza MD [facility]        Today's Diagnoses     Dyspnea and respiratory abnormality    -  1    Organic hypersomnia        Disturbance in sleep behavior          Care Instructions      Your BMI is Body mass index is 33.11 kg/(m^2).  Weight management is a personal decision.  If you are interested in exploring weight loss strategies, the following discussion covers the approaches that may be successful. Body mass index (BMI) is one way to tell whether you are at a healthy weight, overweight, or obese. It measures your weight in relation to your height.  A BMI of 18.5 to 24.9 is in the healthy range. A person with a BMI of 25 to 29.9 is considered overweight, and someone with a BMI of 30 or greater is considered obese. More than two-thirds of American adults are considered overweight or obese.  Being overweight or obese increases the risk for further weight gain. Excess weight may lead to heart disease and diabetes.  Creating and following plans for healthy eating and physical activity may help you improve your health.  Weight control is part of healthy lifestyle and includes exercise, emotional health, and healthy eating habits. Careful eating habits lifelong are the mainstay of weight control. Though there are significant health benefits from weight loss, long-term weight loss with diet alone may be very difficult to achieve- studies show long-term success with dietary management in less than 10% of people. Attaining a healthy weight may be especially difficult to achieve in those with severe obesity. In some cases, medications, devices and surgical management might be considered.  What can you do?  If you are overweight or obese and are interested in methods for  weight loss, you should discuss this with your provider.     Consider reducing daily calorie intake by 500 calories.     Keep a food journal.     Avoiding skipping meals, consider cutting portions instead.    Diet combined with exercise helps maintain muscle while optimizing fat loss. Strength training is particularly important for building and maintaining muscle mass. Exercise helps reduce stress, increase energy, and improves fitness. Increasing exercise without diet control, however, may not burn enough calories to loose weight.       Start walking three days a week 10-20 minutes at a time    Work towards walking thirty minutes five days a week     Eventually, increase the speed of your walking for 1-2 minutes at time    In addition, we recommend that you review healthy lifestyles and methods for weight loss available through the National Institutes of Health patient information sites:  http://win.niddk.nih.gov/publications/index.htm    And look into health and wellness programs that may be available through your health insurance provider, employer, local community center, or cesar club.    Weight management plan: Patient was referred to their PCP to discuss a diet and exercise plan.            Follow-ups after your visit        Follow-up notes from your care team     Return in about 1 day (around 3/29/2017) for results.      Your next 10 appointments already scheduled     Apr 11, 2017  9:30 AM CDT   HST  with ROVERTO BED 5   [facility] ([facility])    [address] [place] [address]   [phone]            Apr 12, 2017 10:10 AM CDT   HST Drop Off with ROVERTO SC DME   [facility] ([facility])    [address] [place] [address]   [phone]            Apr 12, 2017 10:40 AM CDT   Return Sleep Patient with Scotty Peraza MD   [facility] ([facility]  "[facility]    [address] [address] [address]   [phone]              Future tests that were ordered for you today     Open Future Orders        Priority Expected Expires Ordered    HST-Home Sleep Apnea Test Routine  2017 3/28/2017            Who to contact     If you have questions or need follow up information about today's clinic visit or your schedule please contact [facility] directly at [phone].  Normal or non-critical lab and imaging results will be communicated to you by MyChart, letter or phone within 4 business days after the clinic has received the results. If you do not hear from us within 7 days, please contact the clinic through Fetchmobhart or phone. If you have a critical or abnormal lab result, we will notify you by phone as soon as possible.  Submit refill requests through Close or call your pharmacy and they will forward the refill request to us. Please allow 3 business days for your refill to be completed.          Additional Information About Your Visit        FetchmobharMedialets Information     Close lets you send messages to your doctor, view your test results, renew your prescriptions, schedule appointments and more. To sign up, go to [email or web address] . Click on \"Log in\" on the left side of the screen, which will take you to the Welcome page. Then click on \"Sign up Now\" on the right side of the page.     You will be asked to enter the access code listed below, as well as some personal information. Please follow the directions to create your username and password.     Your access code is: [email or web address]  Expires: 2017  1:38 PM     Your access code will  in 90 days. If you need help or a new code, please call your [place] clinic or [phone].        Care EveryWhere ID     This is your Care EveryWhere ID. This could be used by other organizations to access your [place] medical records  [id number]        Your " "Vitals Were     Pulse Height Pulse Oximetry BMI (Body Mass Index)          81 1.702 m (5' 7\") 96% 33.11 kg/m2         Blood Pressure from Last 3 Encounters:   03/28/17 116/75   11/16/11 108/70   11/14/11 121/71    Weight from Last 3 Encounters:   03/28/17 95.9 kg (211 lb 6.4 oz)   11/16/11 90.3 kg (199 lb)   11/14/11 90.3 kg (199 lb)               Primary Care Provider Office Phone # Fax #    Fred Charles BenoitDO pranav [phone]       [address] [address]        Thank you!     Thank you for choosing [facility]  for your care. Our goal is always to provide you with excellent care. Hearing back from our patients is one way we can continue to improve our services. Please take a few minutes to complete the written survey that you may receive in the mail after your visit with us. Thank you!             Your Updated Medication List - Protect others around you: Learn how to safely use, store and throw away your medicines at www.disposemymeds.org.          This list is accurate as of: 3/28/17  1:48 PM.  Always use your most recent med list.                   Brand Name Dispense Instructions for use    ibuprofen 200 MG tablet    ADVIL/MOTRIN     Take 600 mg by mouth every 4 hours as needed for mild pain         "

## 2017-04-11 ENCOUNTER — OFFICE VISIT (OUTPATIENT)
Dept: SLEEP MEDICINE | Facility: CLINIC | Age: 37
End: 2017-04-11
Payer: COMMERCIAL

## 2017-04-11 DIAGNOSIS — G47.9 DISTURBANCE IN SLEEP BEHAVIOR: ICD-10-CM

## 2017-04-11 DIAGNOSIS — R06.89 DYSPNEA AND RESPIRATORY ABNORMALITY: ICD-10-CM

## 2017-04-11 DIAGNOSIS — R06.00 DYSPNEA AND RESPIRATORY ABNORMALITY: ICD-10-CM

## 2017-04-11 DIAGNOSIS — E66.09 NON MORBID OBESITY DUE TO EXCESS CALORIES: ICD-10-CM

## 2017-04-11 DIAGNOSIS — G47.10 ORGANIC HYPERSOMNIA: ICD-10-CM

## 2017-04-11 NOTE — MR AVS SNAPSHOT
After Visit Summary   4/11/2017    Edmund Torres    MRN: [id number]           Patient Information     Date Of Birth          1980        Visit Information        Provider Department      4/11/2017 9:30 AM ROVERTO BED 5 [facility]        Today's Diagnoses     Non morbid obesity due to excess calories        Organic hypersomnia        Disturbance in sleep behavior        Dyspnea and respiratory abnormality           Follow-ups after your visit        Your next 10 appointments already scheduled     Apr 12, 2017 10:10 AM CDT   HST Drop Off with ROVERTO SC DME   [facility] ([facility])    [address]  [facility] [address]   [phone]            Apr 12, 2017 10:40 AM CDT   Return Sleep Patient with Scotty Peraza MD   [facility] ([facility])    [address]  [facility] [id number] [phone]              Who to contact     If you have questions or need follow up information about today's clinic visit or your schedule please contact [facility] directly at [phone].  Normal or non-critical lab and imaging results will be communicated to you by Cleartriphart, letter or phone within 4 business days after the clinic has received the results. If you do not hear from us within 7 days, please contact the clinic through Cleartriphart or phone. If you have a critical or abnormal lab result, we will notify you by phone as soon as possible.  Submit refill requests through ServiceGems or call your pharmacy and they will forward the refill request to us. Please allow 3 business days for your refill to be completed.          Additional Information About Your Visit        Cleartriphart Information     ServiceGems lets you send messages to your doctor, view your test results, renew your prescriptions, schedule appointments and more. To sign up, go to  [email or web address] . Click on \"Log in\" on the left side of the screen, which will take you to the Welcome page. Then click on \"Sign up Now\" on the right side of the page.     You will be asked to enter the access code listed below, as well as some personal information. Please follow the directions to create your username and password.     Your access code is: [email or web address]  Expires: 2017  1:38 PM     Your access code will  in 90 days. If you need help or a new code, please call your [place] clinic or [phone].        Care EveryWhere ID     This is your Care EveryWhere ID. This could be used by other organizations to access your [place] medical records  [id number]         Blood Pressure from Last 3 Encounters:   17 116/75   11 108/70   11 121/71    Weight from Last 3 Encounters:   17 95.9 kg (211 lb 6.4 oz)   11 90.3 kg (199 lb)   11 90.3 kg (199 lb)              We Performed the Following     HST-Home Sleep Apnea Test        Primary Care Provider Office Phone # Fax #    Fred Charles JuanaDO claribel [phone]       [address] [address]        Thank you!     Thank you for choosing [facility]  for your care. Our goal is always to provide you with excellent care. Hearing back from our patients is one way we can continue to improve our services. Please take a few minutes to complete the written survey that you may receive in the mail after your visit with us. Thank you!             Your Updated Medication List - Protect others around you: Learn how to safely use, store and throw away your medicines at www.disposemymeds.org.          This list is accurate as of: 17  9:43 AM.  Always use your most recent med list.                   Brand Name Dispense Instructions for use    ibuprofen 200 MG tablet    ADVIL/MOTRIN     Take 600 mg by mouth every 4 hours as needed for mild pain         "

## 2017-04-12 ENCOUNTER — OFFICE VISIT (OUTPATIENT)
Dept: SLEEP MEDICINE | Facility: CLINIC | Age: 37
End: 2017-04-12
Payer: COMMERCIAL

## 2017-04-12 ENCOUNTER — DOCUMENTATION ONLY (OUTPATIENT)
Dept: SLEEP MEDICINE | Facility: CLINIC | Age: 37
End: 2017-04-12
Payer: COMMERCIAL

## 2017-04-12 VITALS
SYSTOLIC BLOOD PRESSURE: 108 MMHG | HEART RATE: 71 BPM | WEIGHT: 209 LBS | HEIGHT: 67 IN | BODY MASS INDEX: 32.8 KG/M2 | OXYGEN SATURATION: 97 % | DIASTOLIC BLOOD PRESSURE: 72 MMHG

## 2017-04-12 DIAGNOSIS — E66.09 NON MORBID OBESITY DUE TO EXCESS CALORIES: ICD-10-CM

## 2017-04-12 DIAGNOSIS — G47.33 OSA (OBSTRUCTIVE SLEEP APNEA): Primary | Chronic | ICD-10-CM

## 2017-04-12 PROCEDURE — G0399 HOME SLEEP TEST/TYPE 3 PORTA: HCPCS | Performed by: INTERNAL MEDICINE

## 2017-04-12 PROCEDURE — 99214 OFFICE O/P EST MOD 30 MIN: CPT | Performed by: INTERNAL MEDICINE

## 2017-04-12 NOTE — PROCEDURES
"HOME SLEEP STUDY INTERPRETATION    Patient: Edmund Torres  MRN: [id number]  YOB: 1980  Study Date: 4/11/2017  Referring Provider: Fred Montes;   Ordering Provider: Scotty Peraza MD     Indications for Home Study: Edmund Torres is a 36 year old male with symptoms suggestive of obstructive sleep apnea.    Estimated body mass index is 32.73 kg/(m^2) as calculated from the following:    Height as of 4/12/17: 1.702 m (5' 7\").    Weight as of 4/12/17: 94.8 kg (209 lb).  Total score - [place]: 21 (3/28/2017 12:00 PM)  StopBang Total Score: 5 (4/12/2017 10:45 AM)    Data: A full night home sleep study was performed recording the standard physiologic parameters including body position, movement, sound, nasal pressure, thermal oral airflow, chest and abdominal movements with respiratory inductance plethysmography, and oxygen saturation by pulse oximetry. Pulse rate was estimated by oximetry recording. This study was considered adequate based on > 4 hours of quality oximetry and respiratory recording. As specified by the AASM Manual for the Scoring of Sleep and Associated events, version 2.3, Rule VIII.D 1B, 4% oxygen desaturation scoring for hypopneas is used as a standard of care on all home sleep apnea testing.    Analysis Time:  396 minutes    Respiration:   Sleep Associated Hypoxemia: sustained hypoxemia was present. Baseline oxygen saturation was 91%.  Time with saturation less than or equal to 88% was 152 minutes. The lowest oxygen saturation was 63%.   Snoring: Snoring was present.  Respiratory events: The home study revealed a presence of 240 obstructive apneas and 15 mixed and central apneas. There were 205 hypopneas resulting in a combined apnea/hypopnea index [AHI] of 69.5 events per hour.  AHI was 6934, 80.4 per hour supine, n/a per hour prone, 20.9 per hour on left side, and n/a per hour on right side.   Pattern: Excluding events noted above, respiratory rate and pattern " was Normal.    Position: Percent of time spent: supine - 78%, prone - 0%, on left - 20%, on right - 0%.    Heart Rate: By pulse oximetry normal rate was noted.     Assessment:   Severe obstructive sleep apnea.  Sleep associated hypoxemia was present.    Recommendations:  Consider polysomnography with full night PAP titration.  Suggest optimizing sleep hygiene and avoiding sleep deprivation.  Weight management.    Diagnosis Code(s): Obstructive Sleep Apnea G47.33, Hypoxemia G47.36    Scotty Peraza MD, April 12, 2017   Diplomate, American Board of Internal Medicine, Sleep Medicine

## 2017-04-12 NOTE — PROGRESS NOTES
"Home Sleep Apnea Testing Results Visit:    Chief Complaint   Patient presents with     RECHECK     HST results       Edmund Torres is a 36 year old male who returns to [facility] after having had Home Sleep Apnea Testing.  He presented with loud snoring, witnessed apneas, excessive daytime sleepiness (ESS 21), occasional morning headaches, frequent awakenings/choke arousals, narrowed oropharynx, large tonsils, obesity.    Estimated body mass index is 32.73 kg/(m^2) as calculated from the following:    Height as of this encounter: 1.702 m (5' 7\").    Weight as of this encounter: 94.8 kg (209 lb).  Total score - [place]: 21 (3/28/2017 12:00 PM)  STOP-BAN/8    Home Sleep Apnea Testing - 17: 209 lbs 0 oz: AHI 69.5/hr. Supine AHI 80.4/hr.   Oxygen Isaac of 63%.  Baseline 91%.  Sp02 =< 88% for 152 minutes  He slept on his back (78%), prone (0%), left (21%) and right (0%) sides.   Analysis time: 369 minutes.     Signal quality of Oxymeter 94% Fair  Nasal Cannula 94% Fair  RIP belts 94% Fair.     These findings were reviewed with patient.     Past medical/surgical history, family history, social history, medications and allergies were reviewed.        /72  Pulse 71  Ht 1.702 m (5' 7\")  Wt 94.8 kg (209 lb)  SpO2 97%  BMI 32.73 kg/m2     No results for input(s): NA, POTASSIUM, CHLORIDE, CO2, ANIONGAP, GLC, BUN, CR, NIKOLAI in the last 39436 hours.      Impression/Plan:  Severe Obstructive Sleep Apnea.   Severe sleep associated hypoxemia was present.     Home Sleep Apnea Testing was reviewed in detail today with Edmund and a copy given to him for his records.     Treatment options discussed today including  auto-CPAP at 5-18 cmH2O, oral appliance therapy, polysomnography with full night PAP titration or surgical options.    Recommended polysomnography with full night PAP titration given severity of hypoxemia. He may require bilevel pap, may have hypoventilation. He is interested in " surgery, but amenable to starting PAP first.    Morning set-up if possible.     25 minutes spent with patient with >50% spent in counseling and coordination of care.      CC:  Fred Montes

## 2017-04-12 NOTE — MR AVS SNAPSHOT
After Visit Summary   4/12/2017    Edmund Torres    MRN: [id number]           Patient Information     Date Of Birth          1980        Visit Information        Provider Department      4/12/2017 10:40 AM Scotty Peraza MD [facility]        Today's Diagnoses     LOUIS (obstructive sleep apnea)- severe (AHI     -  1    Non morbid obesity due to excess calories          Care Instructions      Your BMI is Body mass index is 32.73 kg/(m^2).  Weight management is a personal decision.  If you are interested in exploring weight loss strategies, the following discussion covers the approaches that may be successful. Body mass index (BMI) is one way to tell whether you are at a healthy weight, overweight, or obese. It measures your weight in relation to your height.  A BMI of 18.5 to 24.9 is in the healthy range. A person with a BMI of 25 to 29.9 is considered overweight, and someone with a BMI of 30 or greater is considered obese. More than two-thirds of American adults are considered overweight or obese.  Being overweight or obese increases the risk for further weight gain. Excess weight may lead to heart disease and diabetes.  Creating and following plans for healthy eating and physical activity may help you improve your health.  Weight control is part of healthy lifestyle and includes exercise, emotional health, and healthy eating habits. Careful eating habits lifelong are the mainstay of weight control. Though there are significant health benefits from weight loss, long-term weight loss with diet alone may be very difficult to achieve- studies show long-term success with dietary management in less than 10% of people. Attaining a healthy weight may be especially difficult to achieve in those with severe obesity. In some cases, medications, devices and surgical management might be considered.  What can you do?  If you are overweight or obese and are interested in methods for weight  loss, you should discuss this with your provider.     Consider reducing daily calorie intake by 500 calories.     Keep a food journal.     Avoiding skipping meals, consider cutting portions instead.    Diet combined with exercise helps maintain muscle while optimizing fat loss. Strength training is particularly important for building and maintaining muscle mass. Exercise helps reduce stress, increase energy, and improves fitness. Increasing exercise without diet control, however, may not burn enough calories to loose weight.       Start walking three days a week 10-20 minutes at a time    Work towards walking thirty minutes five days a week     Eventually, increase the speed of your walking for 1-2 minutes at time    In addition, we recommend that you review healthy lifestyles and methods for weight loss available through the National Institutes of Health patient information sites:  http://win.niddk.nih.gov/publications/index.htm    And look into health and wellness programs that may be available through your health insurance provider, employer, local community center, or cesar club.    Weight management plan: Patient was referred to their PCP to discuss a diet and exercise plan.            Follow-ups after your visit        Follow-up notes from your care team     Return in about 2 months (around 6/12/2017) for Routine Visit.      Your next 10 appointments already scheduled     Apr 18, 2017  8:00 PM CDT   PSG Titration with ROVERTO BED 3   [facility] [place]    [address] [place] [address]   [phone]            Apr 19, 2017  7:30 AM CDT   PAP SETUP with ROVERTO SC DME   [facility] ([facility]    [address] [address]  [facility] [address]   [phone]            Jun 07, 2017  1:40 PM CDT   Return Sleep Patient with Scotty Peraza MD   [facility] ([facility]  "formerly [facility]    [address] [address] [address]   [phone]              Future tests that were ordered for you today     Open Future Orders        Priority Expected Expires Ordered    Comprehensive Sleep Study Routine  10/9/2017 2017            Who to contact     If you have questions or need follow up information about today's clinic visit or your schedule please contact [facility] directly at [phone].  Normal or non-critical lab and imaging results will be communicated to you by MyChart, letter or phone within 4 business days after the clinic has received the results. If you do not hear from us within 7 days, please contact the clinic through Ch[employer] or phone. If you have a critical or abnormal lab result, we will notify you by phone as soon as possible.  Submit refill requests through [employer] or call your pharmacy and they will forward the refill request to us. Please allow 3 business days for your refill to be completed.          Additional Information About Your Visit        [facility] Information     [employer] lets you send messages to your doctor, view your test results, renew your prescriptions, schedule appointments and more. To sign up, go to [email or web address] . Click on \"Log in\" on the left side of the screen, which will take you to the Welcome page. Then click on \"Sign up Now\" on the right side of the page.     You will be asked to enter the access code listed below, as well as some personal information. Please follow the directions to create your username and password.     Your access code is: [email or web address]  Expires: 2017  1:38 PM     Your access code will  in 90 days. If you need help or a new code, please call your [facility] clinic or [phone].        Care EveryWhere ID     This is your Care EveryWhere ID. This could be used by other organizations to access your [facility] medical records  [id number]        Your " "Vitals Were     Pulse Height Pulse Oximetry BMI (Body Mass Index)          71 1.702 m (5' 7\") 97% 32.73 kg/m2         Blood Pressure from Last 3 Encounters:   04/12/17 108/72   03/28/17 116/75   11/16/11 108/70    Weight from Last 3 Encounters:   04/12/17 94.8 kg (209 lb)   03/28/17 95.9 kg (211 lb 6.4 oz)   11/16/11 90.3 kg (199 lb)               Primary Care Provider Office Phone # Fax #    Fred Charles BenoitDO pranav [phone]       [address] [address]        Thank you!     Thank you for choosing [facility]  for your care. Our goal is always to provide you with excellent care. Hearing back from our patients is one way we can continue to improve our services. Please take a few minutes to complete the written survey that you may receive in the mail after your visit with us. Thank you!             Your Updated Medication List - Protect others around you: Learn how to safely use, store and throw away your medicines at www.disposemymeds.org.          This list is accurate as of: 4/12/17 11:16 AM.  Always use your most recent med list.                   Brand Name Dispense Instructions for use    ibuprofen 200 MG tablet    ADVIL/MOTRIN     Take 600 mg by mouth every 4 hours as needed for mild pain         "

## 2017-04-12 NOTE — NURSING NOTE
"Chief Complaint   Patient presents with     RECHECK     HST results       Initial There were no vitals taken for this visit. Estimated body mass index is 33.11 kg/(m^2) as calculated from the following:    Height as of 3/28/17: 1.702 m (5' 7\").    Weight as of 3/28/17: 95.9 kg (211 lb 6.4 oz).  Medication Reconciliation: complete    "

## 2017-04-12 NOTE — PATIENT INSTRUCTIONS

## 2017-04-18 ENCOUNTER — THERAPY VISIT (OUTPATIENT)
Dept: SLEEP MEDICINE | Facility: CLINIC | Age: 37
End: 2017-04-18
Payer: COMMERCIAL

## 2017-04-18 DIAGNOSIS — G47.33 OSA (OBSTRUCTIVE SLEEP APNEA): Chronic | ICD-10-CM

## 2017-04-18 PROCEDURE — 95811 POLYSOM 6/>YRS CPAP 4/> PARM: CPT | Performed by: INTERNAL MEDICINE

## 2017-04-18 NOTE — MR AVS SNAPSHOT
"              After Visit Summary   4/18/2017    Edmund Torres    MRN: [id number]           Patient Information     Date Of Birth          1980        Visit Information        Provider Department      4/18/2017 8:00 PM BK BED 3 [facility]        Today's Diagnoses     LOUIS (obstructive sleep apnea)- severe (AHI            Follow-ups after your visit        Your next 10 appointments already scheduled     Apr 19, 2017  7:30 AM CDT   PAP SETUP with ROVERTO HOGUE   [facility] [place]    [address] [address]  [facility] [id number] [phone]            Jun 07, 2017  1:40 PM CDT   Return Sleep Patient with Scotty Peraza MD   [facility] [place]    [address] [address]  [facility] [address] [phone]              Who to contact     If you have questions or need follow up information about today's clinic visit or your schedule please contact [facility] directly at [phone].  Normal or non-critical lab and imaging results will be communicated to you by Wedding Sp[employer]art, letter or phone within 4 business days after the clinic has received the results. If you do not hear from us within 7 days, please contact the clinic through [employer] or phone. If you have a critical or abnormal lab result, we will notify you by phone as soon as possible.  Submit refill requests through [employer] or call your pharmacy and they will forward the refill request to us. Please allow 3 business days for your refill to be completed.          Additional Information About Your Visit        [employer] Information     [employer] lets you send messages to your doctor, view your test results, renew your prescriptions, schedule appointments and more. To sign up, go to [email or web address] . Click on \"Log in\" on the left side of the screen, which will take you to the Welcome page. Then " "click on \"Sign up Now\" on the right side of the page.     You will be asked to enter the access code listed below, as well as some personal information. Please follow the directions to create your username and password.     Your access code is: [email or web address]  Expires: 2017  1:38 PM     Your access code will  in 90 days. If you need help or a new code, please call your [place] [facility] clinic or [phone].        Care EveryWhere ID     This is your Care EveryWhere ID. This could be used by other organizations to access your [place] medical records  [id number]         Blood Pressure from Last 3 Encounters:   17 108/72   17 116/75   11 108/70    Weight from Last 3 Encounters:   17 94.8 kg (209 lb)   17 95.9 kg (211 lb 6.4 oz)   11 90.3 kg (199 lb)              We Performed the Following     Comprehensive Sleep Study        Primary Care Provider Office Phone # Fax #    Fred Charles Jyoti,  [phone]       [address] [address]        Thank you!     Thank you for choosing [facility]  for your care. Our goal is always to provide you with excellent care. Hearing back from our patients is one way we can continue to improve our services. Please take a few minutes to complete the written survey that you may receive in the mail after your visit with us. Thank you!             Your Updated Medication List - Protect others around you: Learn how to safely use, store and throw away your medicines at www.disposemymeds.org.          This list is accurate as of: 17 11:59 PM.  Always use your most recent med list.                   Brand Name Dispense Instructions for use    ibuprofen 200 MG tablet    ADVIL/MOTRIN     Take 600 mg by mouth every 4 hours as needed for mild pain         "

## 2017-04-19 ENCOUNTER — DOCUMENTATION ONLY (OUTPATIENT)
Dept: SLEEP MEDICINE | Facility: CLINIC | Age: 37
End: 2017-04-19
Payer: COMMERCIAL

## 2017-04-19 DIAGNOSIS — E66.09 NON MORBID OBESITY DUE TO EXCESS CALORIES: ICD-10-CM

## 2017-04-19 DIAGNOSIS — G47.33 OSA (OBSTRUCTIVE SLEEP APNEA): ICD-10-CM

## 2017-04-19 PROCEDURE — 99207 ZZC NO BILLABLE SERVICE THIS VISIT: CPT

## 2017-04-19 NOTE — NURSING NOTE
Completed a all night titration PSG per provider order.    Preliminary AHI 47.  A final therapeutic PAP pressure was achieved.    Supine REM was seen on therapeutic pressure.    Patient reports feeling refreshed in AM.

## 2017-04-19 NOTE — PROGRESS NOTES
Patient was offered choice of vendor and chose [facility].  Patient Edmund Torres was set up at [facility] on April 19, 2017. Patient received a Resmed AirSense 10 CPAP. Pressures were set at 9 cm H2O.   Patient s ramp is 5 cm H2O for Auto and FLEX/EPR is EPR, 2.  Patient received a Resmed Mask name: Airfit N20  Nasal mask Size Large, heated tubing and heated humidifier.  Patient is enrolled in the STM Program and does not need to meet compliance. Patient has a follow up on 6/7/17 with Dr. Peraza.    Carmen Mandel

## 2017-04-20 NOTE — PROCEDURES
"SLEEP STUDY INTERPRETATION  TITRATION STUDY      Patient: Edmund Torres  YOB: 1980  Study Date: 4/18/2017  MRN: [id number]  Ordering Provider: Scotty Peraza MD    Indications for Polysomnography: The patient is a 36 y old Male who is 5' 7\" and weighs 209.0 lbs.  His BMI is 32.8, Linwood sleepiness scale is 21.0 and neck size is 44.0.  A diagnostic polysomnogram PAP titration was performed for severe Obstructive Sleep Apnea with severe sleep associated hypoxemia on Home sleep test.     Polysomnogram Data:  A full night polysomnogram recorded the standard physiologic parameters including EEG, EOG, EMG, ECG, nasal and oral airflow.  Respiratory parameters of chest and abdominal movements were recorded with respiratory inductance plethysmography.  Oxygen saturation was recorded by pulse oximetry.      Treatment PSG:  Sleep Architecture:   The total recording time of the study was 459.8 minutes.  The total sleep time was 450.0 minutes.  Sleep latency was decreased at 2.8 minutes without the use of a sleep aid.  REM latency was 34.5 minutes.  Arousal index was 11.6 arousals per hour.  Sleep efficiency was normal at 97.9%.  Wake after sleep onset was 6.5 minutes.   The patient spent 5.6% of total sleep time in Stage N1, 45.4% in Stage N2, 21.2% in Stage N3 and 27.8% in REM.     Respiration:    The patient was titrated at pressures ranging from 5 cmH2O up to 9 cmH2O.  The optimal pressure achieved was 9 cmH2O with a residual AHI of 1.0 events per hour.  Time in REM supine on final pressure was 44.5 minutes.     This titration was considered optimal (residual AHI < 5 events per hour and including REM-supine sleep at final pressure).     Respiratory rate and pattern - was normal, except for one 5 minute period that appeared periodic.    Sustained Sleep Associated Hypoventilation - Transcutaneous carbon dioxide monitoring was not used    Sleep Associated Hypoxemia - (Greater than 5 minutes O2 sat below 89%) " was not present.  Baseline oxygen saturation was 96.4%. Lowest oxygen saturation was 82.2%.  Time spent less than or equal to 88% was 2.1 minutes.          Movement Activity:     Periodic Limb Activity - There were 4 PLMs during the entire study. The PLM index was 0.5 movements per hour.  The PLM Arousal Index was 0 per hour.    REM EMG Activity - Excessive transient / sustained muscle activity was not present.    Nocturnal Behavior - Abnormal sleep related behaviors were not noted     Bruxism - None apparent.    Cardiac Summary:   The average pulse rate was 63.7 bpm.  The minimum pulse rate was 53.8 bpm while the maximum pulse rate was 99.1 bpm. The rhythm is normal sinus. Arrhythmias were not noted.    Assessment:     Optimal CPAP titration    Brief period of periodic breathing    Recommendations:    Treatment of LOUIS with CPAP at 9 cmH2O.  Recommend clinical follow up with sleep management team, for coaching and review of effectiveness and compliance measures.    Advise regarding the risks of drowsy driving.    Suggest optimizing sleep schedule and avoiding sleep deprivation.    Weight management (if BMI > 30).          _____________________________________   Sctoty Peraza MD         Table of Oximetry Distribution    Range(%) Time in range (min) Time in range (%) Time in or below range (min) Time in or below range (%)   0.0 - 88.0 2.1 0.5% 2.1 0.5%   0.0 - 89.0 3.0 0.7% 3.0 0.7%

## 2017-04-24 ENCOUNTER — DOCUMENTATION ONLY (OUTPATIENT)
Dept: SLEEP MEDICINE | Facility: CLINIC | Age: 37
End: 2017-04-24

## 2017-04-24 NOTE — PROGRESS NOTES
3 DAY STM VISIT    Patient contacted for 3 day STM visit  Subjective measures:  Pt states things are going well and has no issues or complaints.  Pt is benefiting from therapy.    Current settings:  9 cmH2O   Assessment: NIghtly usage over four hours.  Action plan: Pt to have f/u 14 day STM visit.

## 2017-05-04 ENCOUNTER — DOCUMENTATION ONLY (OUTPATIENT)
Dept: SLEEP MEDICINE | Facility: CLINIC | Age: 37
End: 2017-05-04

## 2017-05-04 NOTE — PROGRESS NOTES
14 DAY STM VISIT    Subjective measures:  Pt states things are going well and has no issues or complaints.  Pt is benefiting from therapy.    Assessment: Pt meeting objective benchmarks.  Patient meeting subjective benchmarks.   Action plan: Pt to have 30 day STM visit.   Device settings:  9cm H20  Objective measures: 14 day rolling measure     % compliance greater than four hours rolling average 14 days: 100%     95% OF Leak in litres Rolling Average 14 Days: 11.57 lpm last data upload        AHI Rolling Average 14 Day: 0.69  last data upload        Time mask on face 14 day average: 379 min         Objective measure goal  Compliance   Goal >70%  Leak   Goal < 10%  AHI  Goal < 5  Usage  Goal >240

## 2017-05-22 ENCOUNTER — DOCUMENTATION ONLY (OUTPATIENT)
Dept: SLEEP MEDICINE | Facility: CLINIC | Age: 37
End: 2017-05-22

## 2017-05-22 NOTE — PROGRESS NOTES
30 DAY STM VISIT    Message left for patient to return call     Assessment: Pt meeting objective benchmarks.     Action plan: Waiting for patient to return call and Pt to have 6 month STM visit  Patient has a follow up visit with Dr. Peraza on 6/7/17.   Device settings: 9cm H20    Objective measures: 14 day rolling measures      % compliance greater than four hours rolling average 14 days: 92.8%     95% OF Leak in litres Rolling Average 14 Days: 11.74 lpm  last  upload     AHI Rolling Average 14 Day: 0.49 last  upload     Time mask on face 14 day average: 333 min        Objective measure goal  Compliance   Goal >70%  Leak   Goal < 10%  AHI  Goal < 5  Usage  Goal >240

## 2017-06-07 ENCOUNTER — OFFICE VISIT (OUTPATIENT)
Dept: SLEEP MEDICINE | Facility: CLINIC | Age: 37
End: 2017-06-07
Payer: COMMERCIAL

## 2017-06-07 VITALS
SYSTOLIC BLOOD PRESSURE: 106 MMHG | DIASTOLIC BLOOD PRESSURE: 62 MMHG | OXYGEN SATURATION: 96 % | WEIGHT: 212 LBS | HEART RATE: 64 BPM | BODY MASS INDEX: 33.27 KG/M2 | HEIGHT: 67 IN

## 2017-06-07 DIAGNOSIS — E66.09 NON MORBID OBESITY DUE TO EXCESS CALORIES: ICD-10-CM

## 2017-06-07 DIAGNOSIS — G47.33 OSA (OBSTRUCTIVE SLEEP APNEA): ICD-10-CM

## 2017-06-07 PROCEDURE — 99213 OFFICE O/P EST LOW 20 MIN: CPT | Performed by: INTERNAL MEDICINE

## 2017-06-07 NOTE — PROGRESS NOTES
Sleep Study Follow-Up Visit:    Date on this visit: 6/7/2017    He presented with loud snoring, witnessed apneas, excessive daytime sleepiness (ESS 21), occasional morning headaches, frequent awakenings/choke arousals, narrowed oropharynx, large tonsils, obesity.     Home Sleep Apnea Testing - 4/11/17: 209 lbs 0 oz: AHI 69.5/hr. Supine AHI 80.4/hr.   Oxygen Isaac of 63%.  Baseline 91%.  Sp02 =< 88% for 152 minutes  He slept on his back (78%), prone (0%), left (21%) and right (0%) sides.     Study Date: 4/18/2017  Treatment PSG:  Sleep Architecture:   The total recording time of the study was 459.8 minutes.  The total sleep time was 450.0 minutes.  Sleep latency was decreased at 2.8 minutes without the use of a sleep aid.  REM latency was 34.5 minutes.  Arousal index was 11.6 arousals per hour.  Sleep efficiency was normal at 97.9%.  Wake after sleep onset was 6.5 minutes.   The patient spent 5.6% of total sleep time in Stage N1, 45.4% in Stage N2, 21.2% in Stage N3 and 27.8% in REM.      Respiration:    The patient was titrated at pressures ranging from 5 cmH2O up to 9 cmH2O.  The optimal pressure achieved was 9 cmH2O with a residual AHI of 1.0 events per hour.  Time in REM supine on final pressure was 44.5 minutes.     This titration was considered optimal (residual AHI < 5 events per hour and including REM-supine sleep at final pressure).     Respiratory rate and pattern - was normal, except for one 5 minute period that appeared periodic.    Sustained Sleep Associated Hypoventilation - Transcutaneous carbon dioxide monitoring was not used    Sleep Associated Hypoxemia - (Greater than 5 minutes O2 sat below 89%) was not present.  Baseline oxygen saturation was 96.4%. Lowest oxygen saturation was 82.2%.  Time spent less than or equal to 88% was 2.1 minutes.     Movement Activity:     Periodic Limb Activity - There were 4 PLMs during the entire study. The PLM index was 0.5 movements per hour.  The PLM Arousal Index  was 0 per hour.    REM EMG Activity - Excessive transient / sustained muscle activity was not present.    Nocturnal Behavior - Abnormal sleep related behaviors were not noted     Bruxism - None apparent.     Cardiac Summary:   The average pulse rate was 63.7 bpm.  The minimum pulse rate was 53.8 bpm while the maximum pulse rate was 99.1 bpm. The rhythm is normal sinus. Arrhythmias were not noted.      Overall, the patient rates their experience with PAP as 7 (0 poor, 10 great). The mask is comfortable. The mask is not leaking, 0 nights per week. They are not snoring with the mask on. They are not having gasp arousals.  They are not having significant oral/nasal dryness. The pressure settings are comfortable.     Patient uses nasal mask.    Bedtime is typically 12:30am. Usually it takes about 20 minutes to fall asleep with the mask on. Wake time is typically 7am.  Patient is using PAP therapy 7 hours per night. The patient is usually getting 7 hours of sleep per night.    Patient does feel rested in the morning.    Timnath Sleepiness Scale: 4/24    ResMed   CPAP 9 cmH2O download:  28 total days of use. 2 nonuse days. 90% days with >4 hours use.  Average use 6 hours 19 minutes per day. Median Leak 1.7 L/min. 95%ile Leak 10.3 L/min. AHI 0.6.       Past medical/surgical history, family history, social history, medications and allergies were reviewed.      Problem List:  Patient Active Problem List    Diagnosis Date Noted     LOUIS (obstructive sleep apnea)- severe (AHI 69) 04/12/2017     Priority: Medium     Home Sleep Apnea Testing - 4/11/17: 209 lbs 0 oz: AHI 69.5/hr. Supine AHI 80.4/hr. Oxygen Isaac of 63%.  Baseline 91%.  Sp02 =< 88% for 152 minutes. He slept on his back (78%), prone (0%), left (21%) and right (0%) sides.   Study Date: 4/18/2017- (209.0 lbs) The patient was titrated at pressures ranging from 5 cmH2O up to 9 cmH2O.  The optimal pressure achieved was 9 cmH2O with a residual AHI of 1.0 events per hour.   Time in REM supine on final pressure was 44.5 minutes. Respiratory rate and pattern was normal, except for one 5 minute period that appeared periodic.       Non morbid obesity due to excess calories 03/28/2017     Priority: Low     CARDIOVASCULAR SCREENING; LDL GOAL LESS THAN 160 10/31/2010     Priority: Low        Impression/Plan:    Severe obstructive sleep apnea, tolerating CPAP well with improved daytime symptoms. He is not terribly enthusiastic about prospect of lifelong use. He is interested in losing weight and wonders if taking out his tonsils might help.  -Continue current treatments.   - ENT referral    He will follow up with me in about 2 year(s)    15 minutes spent with patient, all of which were spent face-to-face counseling, consulting, coordinating plan of care.      Scotty Peraza

## 2017-06-07 NOTE — MR AVS SNAPSHOT
After Visit Summary   6/7/2017    Edmund Torres    MRN: [id number]           Patient Information     Date Of Birth          1980        Visit Information        Provider Department      6/7/2017 1:40 PM Scotty Peraza MD [facility]        Today's Diagnoses     LOUIS (obstructive sleep apnea)        Non morbid obesity due to excess calories          Care Instructions      Your BMI is Body mass index is 33.2 kg/(m^2).  Weight management is a personal decision.  If you are interested in exploring weight loss strategies, the following discussion covers the approaches that may be successful. Body mass index (BMI) is one way to tell whether you are at a healthy weight, overweight, or obese. It measures your weight in relation to your height.  A BMI of 18.5 to 24.9 is in the healthy range. A person with a BMI of 25 to 29.9 is considered overweight, and someone with a BMI of 30 or greater is considered obese. More than two-thirds of American adults are considered overweight or obese.  Being overweight or obese increases the risk for further weight gain. Excess weight may lead to heart disease and diabetes.  Creating and following plans for healthy eating and physical activity may help you improve your health.  Weight control is part of healthy lifestyle and includes exercise, emotional health, and healthy eating habits. Careful eating habits lifelong are the mainstay of weight control. Though there are significant health benefits from weight loss, long-term weight loss with diet alone may be very difficult to achieve- studies show long-term success with dietary management in less than 10% of people. Attaining a healthy weight may be especially difficult to achieve in those with severe obesity. In some cases, medications, devices and surgical management might be considered.  What can you do?  If you are overweight or obese and are interested in methods for weight loss, you should  discuss this with your provider.     Consider reducing daily calorie intake by 500 calories.     Keep a food journal.     Avoiding skipping meals, consider cutting portions instead.    Diet combined with exercise helps maintain muscle while optimizing fat loss. Strength training is particularly important for building and maintaining muscle mass. Exercise helps reduce stress, increase energy, and improves fitness. Increasing exercise without diet control, however, may not burn enough calories to loose weight.       Start walking three days a week 10-20 minutes at a time    Work towards walking thirty minutes five days a week     Eventually, increase the speed of your walking for 1-2 minutes at time    In addition, we recommend that you review healthy lifestyles and methods for weight loss available through the National Institutes of Health patient information sites:  http://win.niddk.nih.gov/publications/index.htm    And look into health and wellness programs that may be available through your health insurance provider, employer, local community center, or cesar club.    Weight management plan: Patient was referred to their PCP to discuss a diet and exercise plan.              Follow-ups after your visit        Additional Services     SLEEP ENT REFERRAL       Savanna Maxwell  [facility] [address] [address] [phone]                  Follow-up notes from your care team     Return in about 2 years (around 6/7/2019) for Routine Visit.      Who to contact     If you have questions or need follow up information about today's clinic visit or your schedule please contact [facility] directly at [phone].  Normal or non-critical lab and imaging results will be communicated to you by MyChart, letter or phone within 4 business days after the clinic has received the results. If you do not hear from us within 7 days, please contact the clinic through [employer] or  "phone. If you have a critical or abnormal lab result, we will notify you by phone as soon as possible.  Submit refill requests through [employer] or call your pharmacy and they will forward the refill request to us. Please allow 3 business days for your refill to be completed.          Additional Information About Your Visit        [employer] Information     [employer] lets you send messages to your doctor, view your test results, renew your prescriptions, schedule appointments and more. To sign up, go to [email or web address] . Click on \"Log in\" on the left side of the screen, which will take you to the Welcome page. Then click on \"Sign up Now\" on the right side of the page.     You will be asked to enter the access code listed below, as well as some personal information. Please follow the directions to create your username and password.     Your access code is: [email or web address]  Expires: 2017  1:38 PM     Your access code will  in 90 days. If you need help or a new code, please call your [place] clinic or [phone].        Care EveryWhere ID     This is your Care EveryWhere ID. This could be used by other organizations to access your [place] medical records  [id number]        Your Vitals Were     Pulse Height Pulse Oximetry BMI (Body Mass Index)          64 1.702 m (5' 7\") 96% 33.2 kg/m2         Blood Pressure from Last 3 Encounters:   17 106/62   17 108/72   17 116/75    Weight from Last 3 Encounters:   17 96.2 kg (212 lb)   17 94.8 kg (209 lb)   17 95.9 kg (211 lb 6.4 oz)              We Performed the Following     SLEEP ENT REFERRAL        Primary Care Provider Office Phone # Fax #    Fred Araceli Montes [phone]       [address] [address]        Thank you!     Thank you for choosing [facility]  for your care. Our goal is always to provide you with excellent care. Hearing back from our patients " is one way we can continue to improve our services. Please take a few minutes to complete the written survey that you may receive in the mail after your visit with us. Thank you!             Your Updated Medication List - Protect others around you: Learn how to safely use, store and throw away your medicines at www.disposemymeds.org.          This list is accurate as of: 6/7/17  2:22 PM.  Always use your most recent med list.                   Brand Name Dispense Instructions for use    ibuprofen 200 MG tablet    ADVIL/MOTRIN     Take 600 mg by mouth every 4 hours as needed for mild pain       order for DME      Equipment ordered: RESMED CPAP Mask type: Nasal Settings: 9 cm

## 2017-06-07 NOTE — PATIENT INSTRUCTIONS

## 2017-06-07 NOTE — NURSING NOTE
"Chief Complaint   Patient presents with     CPAP Follow Up       Initial There were no vitals taken for this visit. Estimated body mass index is 32.73 kg/(m^2) as calculated from the following:    Height as of 4/12/17: 1.702 m (5' 7\").    Weight as of 4/12/17: 94.8 kg (209 lb).  Medication Reconciliation: complete    "

## 2017-10-10 ENCOUNTER — OFFICE VISIT (OUTPATIENT)
Dept: OPTOMETRY | Facility: CLINIC | Age: 37
End: 2017-10-10
Payer: COMMERCIAL

## 2017-10-10 ENCOUNTER — OFFICE VISIT (OUTPATIENT)
Dept: FAMILY MEDICINE | Facility: CLINIC | Age: 37
End: 2017-10-10
Payer: COMMERCIAL

## 2017-10-10 VITALS
HEART RATE: 69 BPM | SYSTOLIC BLOOD PRESSURE: 104 MMHG | HEIGHT: 67 IN | OXYGEN SATURATION: 97 % | TEMPERATURE: 98.6 F | BODY MASS INDEX: 33.12 KG/M2 | DIASTOLIC BLOOD PRESSURE: 62 MMHG | WEIGHT: 211 LBS

## 2017-10-10 DIAGNOSIS — H11.222 PYOGENIC GRANULOMA OF LEFT CONJUNCTIVA: ICD-10-CM

## 2017-10-10 DIAGNOSIS — Z23 NEED FOR PROPHYLACTIC VACCINATION AND INOCULATION AGAINST INFLUENZA: ICD-10-CM

## 2017-10-10 DIAGNOSIS — H15.102 EPISCLERITIS OF LEFT EYE: Primary | ICD-10-CM

## 2017-10-10 PROCEDURE — 90471 IMMUNIZATION ADMIN: CPT | Performed by: FAMILY MEDICINE

## 2017-10-10 PROCEDURE — 99202 OFFICE O/P NEW SF 15 MIN: CPT | Performed by: OPTOMETRIST

## 2017-10-10 PROCEDURE — 90686 IIV4 VACC NO PRSV 0.5 ML IM: CPT | Performed by: FAMILY MEDICINE

## 2017-10-10 PROCEDURE — 99213 OFFICE O/P EST LOW 20 MIN: CPT | Mod: 25 | Performed by: FAMILY MEDICINE

## 2017-10-10 RX ORDER — PREDNISOLONE ACETATE 10 MG/ML
1 SUSPENSION/ DROPS OPHTHALMIC 4 TIMES DAILY
Qty: 3 ML | Refills: 0 | Status: SHIPPED | OUTPATIENT
Start: 2017-10-10 | End: 2017-10-24

## 2017-10-10 ASSESSMENT — VISUAL ACUITY
OS_SC: 20/25
OD_SC: 20/20
METHOD: SNELLEN - LINEAR

## 2017-10-10 ASSESSMENT — TONOMETRY
IOP_METHOD: TONOPEN
OS_IOP_MMHG: 19

## 2017-10-10 ASSESSMENT — EXTERNAL EXAM - LEFT EYE: OS_EXAM: NORMAL

## 2017-10-10 ASSESSMENT — PAIN SCALES - GENERAL: PAINLEVEL: NO PAIN (0)

## 2017-10-10 ASSESSMENT — SLIT LAMP EXAM - LIDS
COMMENTS: NORMAL
COMMENTS: NORMAL

## 2017-10-10 ASSESSMENT — EXTERNAL EXAM - RIGHT EYE: OD_EXAM: NORMAL

## 2017-10-10 NOTE — PATIENT INSTRUCTIONS
Pred Forte- 1 drop left eye 4 x day for 2 weeks.    Cold compresses or ice.    Return in 2 weeks for recheck and IOP check.    Ean Patterson OD      Optometry Providers       Clinic Locations                                 Telephone Number   Dr. Joanie Patterson   [facility] [place]  [phone]     [place] [facility] Hours:                [place] [facility] Hours:       [facility] [facility] Hours:  [address]   [address] [facility] [address]     [address] [place] [address]   [place] [address]    [place] [address]  Phone: [phone]                    Phone [phone]                      Phone: [phone]                          Monday 8:00-7:00                          Monday 8:00-7:00                          Monday 8:00-7:00              Tuesday 8:00-6:00                          Tuesday 8:00-7:00                          Tuesday 8:00-7:00              Wednesday 8:00-6:00                  Wednesday 8:00-7:00                   Wednesday 8:00-7:00      Thursday 8:00-6:00                        Thursday 8:00-7:00                         Thursday 8:00-7:00            Friday 8:00-5:00                              Friday 8:00-5:00                              Friday 8:00-5:00    Please log on to [email or web address] to order your contact lenses.  The link is found on the Eye Care and Vision Services page.  As always, Thank you for trusting us with your health care needs!

## 2017-10-10 NOTE — MR AVS SNAPSHOT
After Visit Summary   10/10/2017    Edmund Drew    MRN: [id number]           Patient Information     Date Of Birth          1980        Visit Information        Provider Department      10/10/2017 11:20 AM Anshul Garland MD [facility]        Today's Diagnoses     Episcleritis of left eye    -  1    Need for prophylactic vaccination and inoculation against influenza          Care Instructions        ================================================================================  Normal Values   Blood pressure  <140/90 for most adults    <130/80 for some chronic diseases (ask your care team about yours)    BMI (body mass index)  18.5-25 kg/m2 (based on height and weight)     Thank you for visiting [facility]    Normal or non-critical lab and imaging results will be communicated to you by MyChart, letter or phone within 7 days.  If you do not hear from us within 10 days, please call the clinic. If you have a critical or abnormal lab result, we will notify you by phone as soon as possible.     If you have any questions regarding your visit please contact:     Team Comfort:   Clinic Hours Telephone Number   Dr. Anshul Brock 7am-5pm  Monday - Friday [phone]  Rudy Benitez RN   Pharmacy 8:00am-8pm Monday-Friday    9am-5pm Saturday-Sunday [phone]   Urgent Care 11am-9pm Monday-Friday        9am-5pm Saturday-Sunday [phone]     After hours, weekend or if you need to make an appointment with your primary provider please call [phone].   After Hours nurse advise: call [facility]: [phone]    Medication Refills:  Call your pharmacy and they will forward the refill to us. Please allow 3 business days for your refills to be completed.                  Follow-ups after your visit        Additional Services     OPTOMETRY REFERRAL       yarely Garcia.,  "optometrists  [place] [facility]  [phone]                  Who to contact     If you have questions or need follow up information about today's clinic visit or your schedule please contact [facility] directly at [phone].  Normal or non-critical lab and imaging results will be communicated to you by MyChart, letter or phone within 4 business days after the clinic has received the results. If you do not hear from us within 7 days, please contact the clinic through MyChart or phone. If you have a critical or abnormal lab result, we will notify you by phone as soon as possible.  Submit refill requests through [employer] or call your pharmacy and they will forward the refill request to us. Please allow 3 business days for your refill to be completed.          Additional Information About Your Visit        MyChart Information     [employer] lets you send messages to your doctor, view your test results, renew your prescriptions, schedule appointments and more. To sign up, go to [email or web address] . Click on \"Log in\" on the left side of the screen, which will take you to the Welcome page. Then click on \"Sign up Now\" on the right side of the page.     You will be asked to enter the access code listed below, as well as some personal information. Please follow the directions to create your username and password.     Your access code is: [email or web address]  Expires: 2018 11:56 AM     Your access code will  in 90 days. If you need help or a new code, please call your [facility] or [phone].        Care EveryWhere ID     This is your Care EveryWhere ID. This could be used by other organizations to access your [place] medical records  [id number]        Your Vitals Were     Pulse Temperature Height Pulse Oximetry BMI (Body Mass Index)       69 98.6  F (37  C) (Oral) 1.702 m (5' 7\") 97% 33.05 kg/m2        Blood Pressure from Last 3 Encounters:   10/10/17 104/62 "   06/07/17 106/62   04/12/17 108/72    Weight from Last 3 Encounters:   10/10/17 95.7 kg (211 lb)   06/07/17 96.2 kg (212 lb)   04/12/17 94.8 kg (209 lb)              We Performed the Following          ADMIN VACCINE, FIRST [09126]     HC FLU VAC PRESRV FREE QUAD SPLIT VIR 3+YRS IM  [[id number]]     OPTOMETRY REFERRAL        Primary Care Provider Office Phone # Fax #    Fred Montes [phone]       [address] [address]        Equal Access to Services     [facility] ADRIEN : Hadii aad ku hadasho Somerna, waaxda luqadaha, qaybta kaalmada adeegyada, luma yoder. So [facility] [phone].    ATENCIÓN: Si habla español, tiene a dalton disposición servicios gratuitos de asistencia lingüística. LlVan [facility] [phone].    We comply with applicable federal civil rights laws and [place] laws. We do not discriminate on the basis of race, color, national origin, age, disability, sex, sexual orientation, or gender identity.            Thank you!     Thank you for choosing [facility]  for your care. Our goal is always to provide you with excellent care. Hearing back from our patients is one way we can continue to improve our services. Please take a few minutes to complete the written survey that you may receive in the mail after your visit with us. Thank you!             Your Updated Medication List - Protect others around you: Learn how to safely use, store and throw away your medicines at www.disposemymeds.org.          This list is accurate as of: 10/10/17 11:56 AM.  Always use your most recent med list.                   Brand Name Dispense Instructions for use Diagnosis    order for DME      Equipment ordered: RESMED CPAP Mask type: Nasal Settings: 9 cm

## 2017-10-10 NOTE — NURSING NOTE
"Chief Complaint   Patient presents with     Eye Problem       Initial /62 (BP Location: Left arm, Patient Position: Chair, Cuff Size: Adult Large)  Pulse 69  Temp 98.6  F (37  C) (Oral)  Ht 5' 7\" (1.702 m)  Wt 211 lb (95.7 kg)  SpO2 97%  BMI 33.05 kg/m2 Estimated body mass index is 33.05 kg/(m^2) as calculated from the following:    Height as of this encounter: 5' 7\" (1.702 m).    Weight as of this encounter: 211 lb (95.7 kg).  Medication Reconciliation: betsy Lin MA      "

## 2017-10-10 NOTE — Clinical Note
Jose Luis Schneider- that little lump on the sclera was a pyogenic granuloma with episcleritis- I prescribed a steroid drop and will follow up with him in a couple of weeks- thanks for sending him over! Ean

## 2017-10-10 NOTE — PROGRESS NOTES
Chief Complaint   Patient presents with     Eye Problem     os eye pain x 1 week       HPI    Affected eye(s):  Left   Symptoms:     Redness   Foreign body sensation   Itching   Burning   Photophobia      Duration:  1 week   Frequency:  Constant       Do you have eye pain now?:  Yes   Location:  OS   Pain Level:  Severe Pain (6)   Pain Duration:  1 week   Pain Frequency:  Constant   Pain Characteristics:  Sharp/Quick                Medical, surgical and family histories reviewed and updated 10/10/2017.       OBJECTIVE: See Ophthalmology exam    ASSESSMENT:    ICD-10-CM    1. Episcleritis of left eye H15.102 prednisoLONE acetate (PRED FORTE) 1 % ophthalmic susp   2. 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    Cold compresses or ice.    Return in 2 weeks for recheck and IOP check.    Ean Patterson, MANDEEP

## 2017-10-10 NOTE — MR AVS SNAPSHOT
After Visit Summary   10/10/2017    Edmund Drew    MRN: [id number]           Patient Information     Date Of Birth          1980        Visit Information        Provider Department      10/10/2017 2:20 PM Ean Patterson OD [facility]        Today's Diagnoses     Episcleritis of left eye    -  1    Pyogenic granuloma of left conjunctiva          Care Instructions    Pred Forte- 1 drop left eye 4 x day for 2 weeks.    Cold compresses or ice.    Return in 2 weeks for recheck and IOP check.    Ean Patterson OD      Optometry Providers       Clinic Locations                                 Telephone Number   Dr. Joanie Patterson   [facility] [place]  [phone]     [place] [facility] Hours:                [place] [facility] Hours:       [facility] Hours:  [address]   [address] [facility]     [address] [place] [address]   [place] [address]    [place] [address]  Phone: [phone]                    Phone [phone]                      Phone: [phone]                          Monday 8:00-7:00                          Monday 8:00-7:00                          Monday 8:00-7:00              Tuesday 8:00-6:00                          Tuesday 8:00-7:00                          Tuesday 8:00-7:00              Wednesday 8:00-6:00                  Wednesday 8:00-7:00                   Wednesday 8:00-7:00      Thursday 8:00-6:00                        Thursday 8:00-7:00                         Thursday 8:00-7:00            Friday 8:00-5:00                              Friday 8:00-5:00                              Friday 8:00-5:00    Please log on to [email or web address] to order your contact lenses.  The link is found on the Eye Care and Vision Services page.  As always, Thank you for trusting us with your health care needs!              Follow-ups after your  "visit        Your next 10 appointments already scheduled     Oct 10, 2017  2:20 PM CDT   [facility]    [address] [place] [address] [phone]              Who to contact     If you have questions or need follow up information about today's clinic visit or your schedule please contact [facility] directly at [phone].  Normal or non-critical lab and imaging results will be communicated to you by MyChart, letter or phone within 4 business days after the clinic has received the results. If you do not hear from us within 7 days, please contact the clinic through [employer] or phone. If you have a critical or abnormal lab result, we will notify you by phone as soon as possible.  Submit refill requests through [employer] or call your pharmacy and they will forward the refill request to us. Please allow 3 business days for your refill to be completed.          Additional Information About Your Visit        MyChart Information     [employer] lets you send messages to your doctor, view your test results, renew your prescriptions, schedule appointments and more. To sign up, go to [email or web address] . Click on \"Log in\" on the left side of the screen, which will take you to the Welcome page. Then click on \"Sign up Now\" on the right side of the page.     You will be asked to enter the access code listed below, as well as some personal information. Please follow the directions to create your username and password.     Your access code is: [email or web address]  Expires: 2018 11:56 AM     Your access code will  in 90 days. If you need help or a new code, please call your [place] clinic or [phone].        Care EveryWhere ID     This is your Care EveryWhere ID. This could be used by other organizations to access your [place] medical records  [id number]         Blood Pressure from Last 3 " Encounters:   10/10/17 104/62   06/07/17 106/62   04/12/17 108/72    Weight from Last 3 Encounters:   10/10/17 95.7 kg (211 lb)   06/07/17 96.2 kg (212 lb)   04/12/17 94.8 kg (209 lb)              Today, you had the following     No orders found for display         Today's Medication Changes          These changes are accurate as of: 10/10/17  1:15 PM.  If you have any questions, ask your nurse or doctor.               Start taking these medicines.        Dose/Directions    prednisoLONE acetate 1 % ophthalmic susp   Commonly known as:  PRED FORTE   Used for:  Episcleritis of left eye   Started by:  Ean Patterson, OD        Dose:  1 drop   Apply 1 drop to eye 4 times daily for 14 days   Quantity:  3 mL   Refills:  0            Where to get your medicines      These medications were sent to [facility] - [place], [place] - [address] AT [facility] [address], [facility] [address]     Phone:  [phone]     prednisoLONE acetate 1 % ophthalmic susp                Primary Care Provider Office Phone # Fax #    Fred Dallasgonzalez Jyoti  [phone]       [address] [address]        Equal Access to Services     BETTY JURADO AH: Hadii aad ku hadasho Soomaali, waaxda luqadaha, qaybta kaalmada adeegyada, waxay idiin haynito yoder. So [facility] [phone].    ATENCIÓN: Si habla español, tiene a dalton disposición servicios gratuitos de asistencia lingüística. Llame al [phone].    We comply with applicable federal civil rights laws and [place] laws. We do not discriminate on the basis of race, color, national origin, age, disability, sex, sexual orientation, or gender identity.            Thank you!     Thank you for choosing [facility]  for your care. Our goal is always to provide you with excellent care. Hearing back from our patients is one way we can continue to improve our services. Please take  a few minutes to complete the written survey that you may receive in the mail after your visit with us. Thank you!             Your Updated Medication List - Protect others around you: Learn how to safely use, store and throw away your medicines at www.disposemymeds.org.          This list is accurate as of: 10/10/17  1:15 PM.  Always use your most recent med list.                   Brand Name Dispense Instructions for use Diagnosis    order for DME      Equipment ordered: RESMED CPAP Mask type: Nasal Settings: 9 cm        prednisoLONE acetate 1 % ophthalmic susp    PRED FORTE    3 mL    Apply 1 drop to eye 4 times daily for 14 days    Episcleritis of left eye

## 2017-10-10 NOTE — PATIENT INSTRUCTIONS
================================================================================  Normal Values   Blood pressure  <140/90 for most adults    <130/80 for some chronic diseases (ask your care team about yours)    BMI (body mass index)  18.5-25 kg/m2 (based on height and weight)     Thank you for visiting [facility]    Normal or non-critical lab and imaging results will be communicated to you by MyChart, letter or phone within 7 days.  If you do not hear from us within 10 days, please call the clinic. If you have a critical or abnormal lab result, we will notify you by phone as soon as possible.     If you have any questions regarding your visit please contact:     Team Comfort:   Clinic Hours Telephone Number   Dr. Anshul Barton [address] [place] 7am-5pm  Monday - Friday [phone]  Rudy RN  Deidra RN  Eli RN   Pharmacy 8:00am-8pm Monday-Friday    9am-5pm Saturday-Sunday [phone]   Urgent Care 11am-9pm Monday-Friday        9am-5pm Saturday-Sunday [phone]     After hours, weekend or if you need to make an appointment with your primary provider please call [phone].   After Hours nurse advise: call [place] [facility]: [phone]    Medication Refills:  Call your pharmacy and they will forward the refill to us. Please allow 3 business days for your refills to be completed.

## 2017-10-10 NOTE — PROGRESS NOTES
"  SUBJECTIVE:   Edmund Drew is a 36 year old male who presents to clinic today for the following health issues:      Eye(s) Problem  Onset: about 1 week (10/2/2017)    Description:   His redness started on Monday, 10/2/2017. Since onset, it has not improved, despite attempting over-the-counter eye drops as treatment. He notes that this has not spread to the right eye at any time while patient is symptoms.   Location: left  Pain: YES  Redness: YES    Accompanying Signs & Symptoms:  Discharge/mattering: no  Swelling: no  Visual changes: no  Fever: no  Nasal Congestion: no  Bothered by bright lights: YES    History:   Trauma: no   Foreign body exposure: no     Precipitating factors:   Wearing contacts: no     Alleviating factors:  Improved by: none    Therapies Tried and outcome: over-the-counter eye drops    Past medical, family, and social histories, medications, and allergies are reviewed and updated in Epic.      ROS:  Constitutional, HEENT, cardiovascular, pulmonary, gi and gu systems are negative, except as otherwise noted.    This document serves as a record of the services and decisions personally performed and made by Anshul Garland MD. It was created on his behalf by Lawanda Russo, a trained medical scribe. The creation of this document is based the provider's statements to the medical scribe.    Scribe Lawanda Russo 11:43 AM 10/10/2017    OBJECTIVE:   /62 (BP Location: Left arm, Patient Position: Chair, Cuff Size: Adult Large)  Pulse 69  Temp 98.6  F (37  C) (Oral)  Ht 1.702 m (5' 7\")  Wt 95.7 kg (211 lb)  SpO2 97%  BMI 33.05 kg/m2  Body mass index is 33.05 kg/(m^2).  GENERAL: healthy, alert and no distress  EYES: Eyes PERRL, EOMI, sclerae white, however there is diffuse conjunctival injection of the left eye medially, with a red nodular lesion just medial to the iris, consistent with episcleritis.  MS: no gross musculoskeletal defects noted, no edema  SKIN: no suspicious lesions or " rashes  NEURO: Normal strength and tone, sensory exam grossly normal, mentation intact, oriented times 3 and cranial nerves 2-12 intact  PSYCH: mentation appears normal, affect normal/bright     ASSESSMENT/PLAN:   1. Episcleritis of left eye  I would like him to see the eye doctor today for this.  - OPTOMETRY REFERRAL    2. Need for prophylactic vaccination and inoculation against influenza  Influenza vaccine offered and accepted by patient. He has received it before without problems.  - HC FLU VAC PRESRV FREE QUAD SPLIT VIR 3+YRS IM  [47102]  -      ADMIN VACCINE, FIRST [94508]    The information in this document, created by a scribe for me, accurately reflects the services I personally performed and the decisions made by me. I have reviewed and approved this document for accuracy.   Anshul Garland MD                             Injectable Influenza Immunization Documentation    1.  Is the person to be vaccinated sick today?   No    2. Does the person to be vaccinated have an allergy to a component   of the vaccine?   No    3. Has the person to be vaccinated ever had a serious reaction   to influenza vaccine in the past?   No    4. Has the person to be vaccinated ever had Guillain-Barré syndrome?   No    Form completed by IVONE Lin MA

## 2017-10-24 ENCOUNTER — OFFICE VISIT (OUTPATIENT)
Dept: OPTOMETRY | Facility: CLINIC | Age: 37
End: 2017-10-24
Payer: COMMERCIAL

## 2017-10-24 DIAGNOSIS — H11.222 PYOGENIC GRANULOMA OF LEFT CONJUNCTIVA: Primary | ICD-10-CM

## 2017-10-24 PROCEDURE — 99212 OFFICE O/P EST SF 10 MIN: CPT | Performed by: OPTOMETRIST

## 2017-10-24 ASSESSMENT — VISUAL ACUITY
OD_SC: 20/20
METHOD: SNELLEN - LINEAR
OD_SC+: -1
OS_SC+: -1
OS_SC: 20/40

## 2017-10-24 ASSESSMENT — EXTERNAL EXAM - LEFT EYE: OS_EXAM: NORMAL

## 2017-10-24 ASSESSMENT — TONOMETRY
IOP_METHOD: TONOPEN
OS_IOP_MMHG: 16

## 2017-10-24 ASSESSMENT — EXTERNAL EXAM - RIGHT EYE: OD_EXAM: NORMAL

## 2017-10-24 ASSESSMENT — SLIT LAMP EXAM - LIDS
COMMENTS: NORMAL
COMMENTS: NORMAL

## 2017-10-24 NOTE — MR AVS SNAPSHOT
"              After Visit Summary   10/24/2017    Edmund Drew    MRN: [id number]           Patient Information     Date Of Birth          1980        Visit Information        Provider Department      10/24/2017 9:20 AM Ean Patterson OD [facility]        Today's Diagnoses     Pyogenic granuloma of left conjunctiva    -  1      Care Instructions    Referral to Dr. Walden at [facility] for evaluation.    Continue Pred Forte- 1 drop left eye 4 x day.      Ean Patterson, MANDEEP            Follow-ups after your visit        Your next 10 appointments already scheduled     Oct 31, 2017  9:15 AM CDT   New Visit with Omari Walden MD   [facility]    [address] [place] [address] [phone]              Who to contact     If you have questions or need follow up information about today's clinic visit or your schedule please contact [facility] directly at [phone].  Normal or non-critical lab and imaging results will be communicated to you by [employer], letter or phone within 4 business days after the clinic has received the results. If you do not hear from us within 7 days, please contact the clinic through [employer] or phone. If you have a critical or abnormal lab result, we will notify you by phone as soon as possible.  Submit refill requests through [employer] or call your pharmacy and they will forward the refill request to us. Please allow 3 business days for your refill to be completed.          Additional Information About Your Visit        [employer] Information     [employer] lets you send messages to your doctor, view your test results, renew your prescriptions, schedule appointments and more. To sign up, go to [email or web address] . Click on \"Log in\" on the left side of the screen, which will take you to the Welcome page. Then click on \"Sign up Now\" on the right side of the " page.     You will be asked to enter the access code listed below, as well as some personal information. Please follow the directions to create your username and password.     Your access code is: [email or web address]  Expires: 2018 11:56 AM     Your access code will  in 90 days. If you need help or a new code, please call your [facility] clinic or [phone].        Care EveryWhere ID     This is your Care EveryWhere ID. This could be used by other organizations to access your [facility] medical records  [id number]         Blood Pressure from Last 3 Encounters:   10/10/17 104/62   17 106/62   17 108/72    Weight from Last 3 Encounters:   10/10/17 95.7 kg (211 lb)   17 96.2 kg (212 lb)   17 94.8 kg (209 lb)              Today, you had the following     No orders found for display       Primary Care Provider Office Phone # Fax #    Fred Araceli Montes [phone]       [address] [address]        Equal Access to Services     [facility]: Hadii aad ku hadasho Soomaali, waaxda luqadaha, qaybta kaalmada adeegyada, waxay idiin hayaan rom page . So [facility] [phone].    ATENCIÓN: Si habla español, tiene a dalton disposición servicios gratuitos de asistencia lingüística. Carine al [phone].    We comply with applicable federal civil rights laws and [place] laws. We do not discriminate on the basis of race, color, national origin, age, disability, sex, sexual orientation, or gender identity.            Thank you!     Thank you for choosing [facility]  for your care. Our goal is always to provide you with excellent care. Hearing back from our patients is one way we can continue to improve our services. Please take a few minutes to complete the written survey that you may receive in the mail after your visit with us. Thank you!             Your Updated Medication List - Protect others around you: Learn how to  safely use, store and throw away your medicines at www.disposemymeds.org.          This list is accurate as of: 10/24/17  9:47 AM.  Always use your most recent med list.                   Brand Name Dispense Instructions for use Diagnosis    order for DME      Equipment ordered: RESMED CPAP Mask type: Nasal Settings: 9 cm        prednisoLONE acetate 1 % ophthalmic susp    PRED FORTE    3 mL    Apply 1 drop to eye 4 times daily for 14 days    Episcleritis of left eye

## 2017-10-24 NOTE — PATIENT INSTRUCTIONS
Referral to Dr. Walden at [facility] for evaluation.    Continue Pred Forte- 1 drop left eye 4 x day.      Ean Patterson, OD

## 2017-10-24 NOTE — PROGRESS NOTES
Red Eye Recheck    Reason:   Chief Complaint   Patient presents with     Eye Problem     2 week red eye shreyas os/IOP ck         Eyes feel: better,but still bothers patient,feels like something still in eye    Medications used: Pred Forte    How often: 4 times per day      Adela Anderson, Optometric Assistant, A.B.O.C.     OBJECTIVE:     See ophthalmology exam      ASSESSMENT:        ICD-10-CM    1. Pyogenic granuloma of left conjunctiva H11.222            PLAN:       Patient Instructions   Referral to Dr. Walden at [facility] for evaluation.    Continue Pred Forte- 1 drop left eye 4 x day.      Ean Patterson, OD

## 2017-10-31 ENCOUNTER — OFFICE VISIT (OUTPATIENT)
Dept: OPHTHALMOLOGY | Facility: CLINIC | Age: 37
End: 2017-10-31
Payer: COMMERCIAL

## 2017-10-31 DIAGNOSIS — H11.222 PYOGENIC GRANULOMA OF CONJUNCTIVA, LEFT: Primary | ICD-10-CM

## 2017-10-31 PROCEDURE — 92002 INTRM OPH EXAM NEW PATIENT: CPT | Performed by: OPHTHALMOLOGY

## 2017-10-31 RX ORDER — TOBRAMYCIN AND DEXAMETHASONE 3; 1 MG/ML; MG/ML
1 SUSPENSION/ DROPS OPHTHALMIC 4 TIMES DAILY
Qty: 5 ML | Refills: 0 | Status: SHIPPED | OUTPATIENT
Start: 2017-10-31 | End: 2017-11-14

## 2017-10-31 ASSESSMENT — EXTERNAL EXAM - RIGHT EYE: OD_EXAM: NORMAL

## 2017-10-31 ASSESSMENT — VISUAL ACUITY
METHOD: SNELLEN - LINEAR
OS_SC: 20/15
OD_SC: 20/15
OS_SC+: -2

## 2017-10-31 ASSESSMENT — SLIT LAMP EXAM - LIDS
COMMENTS: NORMAL
COMMENTS: NORMAL

## 2017-10-31 ASSESSMENT — EXTERNAL EXAM - LEFT EYE: OS_EXAM: NORMAL

## 2017-10-31 NOTE — MR AVS SNAPSHOT
After Visit Summary   10/31/2017    Edmund Drew    MRN: [id number]           Patient Information     Date Of Birth          1980        Visit Information        Provider Department      10/31/2017 9:15 AM Omari Walden MD [facility]        Today's Diagnoses     Pyogenic granuloma of conjunctiva, left    -  1      Care Instructions    Stop prednisolone  And start Tobradex drops. 1 drop left eye four times daily for 2 weeks  Return as scheduled          Follow-ups after your visit        Follow-up notes from your care team     Return in about 2 weeks (around 11/14/2017) for recheck.      Your next 10 appointments already scheduled     Nov 14, 2017  9:30 AM CST   Return Visit with Omari Walden MD   [facility]    [address] [place] [address] [phone]              Who to contact     If you have questions or need follow up information about today's clinic visit or your schedule please contact [facility] directly at [phone].  Normal or non-critical lab and imaging results will be communicated to you by [employer], letter or phone within 4 business days after the clinic has received the results. If you do not hear from us within 7 days, please contact the clinic through [employer] or phone. If you have a critical or abnormal lab result, we will notify you by phone as soon as possible.  Submit refill requests through [email or web address] or call your pharmacy and they will forward the refill request to us. Please allow 3 business days for your refill to be completed.          Additional Information About Your Visit        [employer] Information     [email or web address] is an electronic gateway that provides easy, online access to your medical records. With [email or web address], you can request a clinic appointment, read your test results, renew a prescription or communicate with your care team.     To sign up  for MyChart visit the website at [email or web address]   You will be asked to enter the access code listed below, as well as some personal information. Please follow the directions to create your username and password.     Your access code is: [email or web address]  Expires: 2018 11:56 AM     Your access code will  in 90 days. If you need help or a new code, please contact your [facility] or call [phone] for assistance.        Care EveryWhere ID     This is your Care EveryWhere ID. This could be used by other organizations to access your [place] medical records  [id number]         Blood Pressure from Last 3 Encounters:   10/10/17 104/62   17 106/62   17 108/72    Weight from Last 3 Encounters:   10/10/17 95.7 kg (211 lb)   17 96.2 kg (212 lb)   17 94.8 kg (209 lb)              Today, you had the following     No orders found for display       Primary Care Provider Office Phone # Fax #    Fred Araceli Motnes [phone]       [address] [address]        Equal Access to Services     BETTY JURADO : Hadii aad ku hadasho Soomaali, waaxda luqadaha, qaybta kaalmada adeegyada, waxay idiin haymerlyn rom denise lakendall . So [facility] [phone].    ATENCIÓN: Si habla español, tiene a dalton disposición servicios gratuitos de asistencia lingüística. Llame al [phone].    We comply with applicable federal civil rights laws and [place] laws. We do not discriminate on the basis of race, color, national origin, age, disability, sex, sexual orientation, or gender identity.            Thank you!     Thank you for choosing [facility]  for your care. Our goal is always to provide you with excellent care. Hearing back from our patients is one way we can continue to improve our services. Please take a few minutes to complete the written survey that you may receive in the mail after your visit with  us. Thank you!             Your Updated Medication List - Protect others around you: Learn how to safely use, store and throw away your medicines at www.disposemymeds.org.          This list is accurate as of: 10/31/17  9:29 AM.  Always use your most recent med list.                   Brand Name Dispense Instructions for use Diagnosis    order for DME      Equipment ordered: RESMED CPAP Mask type: Nasal Settings: 9 cm

## 2017-10-31 NOTE — PROGRESS NOTES
Assessment & Plan   Edmund Drew is a 36 year old male who presents with:   Review of systems for the eyes was negative other than the pertinent positives and negatives noted in the HPI.    Pyogenic granuloma of conjunctiva, left  - Today we incised the lesion with a TB syringe  - Start Tobramycin-dexamethasone (TOBRADEX) 0.3-0.1 % ophthalmic susp; Place 1 drop Into the left eye 4 times daily for 14 days  - Stop PF      Return in 2 wks. If not improved perform excisional Bx.    Documentation for today's encounter was performed by Shirin Parada COA. OSC. Acting as a scribe in my presence. I have reviewed and verified that it is an accurate recording of today's encounter.    Attending Physician Attestation:  Complete documentation of historical and exam elements from today's encounter can be found in the full encounter summary report (not reduplicated in this progress note).  I personally obtained the chief complaint(s) and history of present illness.  I confirmed and edited as necessary the review of systems, past medical/surgical history, family history, social history, and examination findings as documented by others; and I examined the patient myself.  I personally reviewed the relevant tests, images, and reports as documented above.  I formulated and edited as necessary the assessment and plan and discussed the findings and management plan with the patient and family. - Omari Walden MD

## 2017-10-31 NOTE — PATIENT INSTRUCTIONS
Stop prednisolone  And start Tobradex drops. 1 drop left eye four times daily for 2 weeks  Return as scheduled

## 2017-10-31 NOTE — LETTER
10/31/2017       RE: Edmund Drew  [address]  [facility] [address]     Dear Colleague,    Thank you for referring your patient, Edmund Drew, to the [facility]. Please see a copy of my visit note below.    Assessment & Plan   Edmund Drew is a 36 year old male who presents with:   Review of systems for the eyes was negative other than the pertinent positives and negatives noted in the HPI.    Pyogenic granuloma of conjunctiva, left  - Today we incised the lesion with a TB syringe  - Start Tobramycin-dexamethasone (TOBRADEX) 0.3-0.1 % ophthalmic susp; Place 1 drop Into the left eye 4 times daily for 14 days  - Stop PF      Return in 2 wks. If not improved perform excisional Bx.    Documentation for today's encounter was performed by Shirin Parada COA. OSC. Acting as a scribe in my presence. I have reviewed and verified that it is an accurate recording of today's encounter.    Attending Physician Attestation:  Complete documentation of historical and exam elements from today's encounter can be found in the full encounter summary report (not reduplicated in this progress note).  I personally obtained the chief complaint(s) and history of present illness.  I confirmed and edited as necessary the review of systems, past medical/surgical history, family history, social history, and examination findings as documented by others; and I examined the patient myself.  I personally reviewed the relevant tests, images, and reports as documented above.  I formulated and edited as necessary the assessment and plan and discussed the findings and management plan with the patient and family. - Omari Walden MD      Again, thank you for allowing me to participate in the care of your patient.      Sincerely,    Omari Walden MD

## 2017-11-20 ENCOUNTER — OFFICE VISIT (OUTPATIENT)
Dept: OPHTHALMOLOGY | Facility: CLINIC | Age: 37
End: 2017-11-20
Payer: COMMERCIAL

## 2017-11-20 DIAGNOSIS — H11.222 PYOGENIC GRANULOMA OF CONJUNCTIVA, LEFT: Primary | ICD-10-CM

## 2017-11-20 PROCEDURE — 68110 EXC LES CONJUNCTIVA <1 CM: CPT | Mod: LT | Performed by: OPHTHALMOLOGY

## 2017-11-20 ASSESSMENT — SLIT LAMP EXAM - LIDS
COMMENTS: NORMAL
COMMENTS: NORMAL

## 2017-11-20 ASSESSMENT — EXTERNAL EXAM - RIGHT EYE: OD_EXAM: NORMAL

## 2017-11-20 ASSESSMENT — VISUAL ACUITY
OD_SC: 20/15
OS_SC: 20/15
METHOD: SNELLEN - LINEAR
OS_SC+: -2

## 2017-11-20 ASSESSMENT — EXTERNAL EXAM - LEFT EYE: OS_EXAM: NORMAL

## 2017-11-20 NOTE — MR AVS SNAPSHOT
After Visit Summary   11/20/2017    Edmund Drew    MRN: [id number]           Patient Information     Date Of Birth          1980        Visit Information        Provider Department      11/20/2017 1:45 PM Omari Walden MD [facility]        Today's Diagnoses     Pyogenic granuloma of conjunctiva, left    -  1      Care Instructions    Use the tobradex ointment four times daily for 1 week  Return next week for a recheck          Follow-ups after your visit        Your next 10 appointments already scheduled     Nov 27, 2017  1:00 PM CST   Return Visit with Omari Walden MD   [facility] ([facility]    [address] [place] [address] [phone]              Who to contact     If you have questions or need follow up information about today's clinic visit or your schedule please contact [facility] directly at [phone].  Normal or non-critical lab and imaging results will be communicated to you by MyChart, letter or phone within 4 business days after the clinic has received the results. If you do not hear from us within 7 days, please contact the clinic through [employer] or phone. If you have a critical or abnormal lab result, we will notify you by phone as soon as possible.  Submit refill requests through [employer] or call your pharmacy and they will forward the refill request to us. Please allow 3 business days for your refill to be completed.          Additional Information About Your Visit        MyChart Information     [employer] is an electronic gateway that provides easy, online access to your medical records. With [employer], you can request a clinic appointment, read your test results, renew a prescription or communicate with your care team.     To sign up for [employer] visit the website at [email or web address]   You will be asked to enter the access code listed below, as well  as some personal information. Please follow the directions to create your username and password.     Your access code is: [email or web address]  Expires: 2018 10:56 AM     Your access code will  in 90 days. If you need help or a new code, please contact your [facility] or call [phone] for assistance.        Care EveryWhere ID     This is your Care EveryWhere ID. This could be used by other organizations to access your [place] medical records  [id number]         Blood Pressure from Last 3 Encounters:   10/10/17 104/62   17 106/62   17 108/72    Weight from Last 3 Encounters:   10/10/17 95.7 kg (211 lb)   17 96.2 kg (212 lb)   17 94.8 kg (209 lb)              Today, you had the following     No orders found for display       Primary Care Provider Office Phone # Fax #    Fred Araceli Montes,  [phone]       [address] [address]        Equal Access to Services     [facility] : Hadii aad ku hadasho Soomaali, waaxda luqadaha, qaybta kaalmada adeegyada, luma page . So [facility] [phone].    ATENCIÓN: Si habla español, tiene a dalton disposición servicios gratuitos de asistencia lingüística. LlK[facility] [phone].    We comply with applicable federal civil rights laws and [place] laws. We do not discriminate on the basis of race, color, national origin, age, disability, sex, sexual orientation, or gender identity.            Thank you!     Thank you for choosing [facility]  for your care. Our goal is always to provide you with excellent care. Hearing back from our patients is one way we can continue to improve our services. Please take a few minutes to complete the written survey that you may receive in the mail after your visit with us. Thank you!             Your Updated Medication List - Protect others around you: Learn how to safely use, store and throw  away your medicines at www.disposemymeds.org.          This list is accurate as of: 11/20/17  2:00 PM.  Always use your most recent med list.                   Brand Name Dispense Instructions for use Diagnosis    order for DME      Equipment ordered: RESMED CPAP Mask type: Nasal Settings: 9 cm

## 2017-11-20 NOTE — NURSING NOTE
Patient presents with:  Granuloma Of Conjunctiva Follow Up: left eye, Tdex used but did not help it still bothers him.      Referring Provider:  No referring provider defined for this encounter.    HPI    Affected eye(s):  Left   Symptoms:           Do you have eye pain now?:  No      Comments:     Granuloma Of Conjunctiva Follow Up: left eye, Tdex used but did not help it still bothers him.               Shirin HARRIS. OSC

## 2017-11-22 LAB — COPATH REPORT: NORMAL

## 2017-11-27 ENCOUNTER — OFFICE VISIT (OUTPATIENT)
Dept: OPHTHALMOLOGY | Facility: CLINIC | Age: 37
End: 2017-11-27
Payer: COMMERCIAL

## 2017-11-27 DIAGNOSIS — H11.222 PYOGENIC GRANULOMA OF CONJUNCTIVA, LEFT: Primary | ICD-10-CM

## 2017-11-27 PROCEDURE — 99024 POSTOP FOLLOW-UP VISIT: CPT | Performed by: OPHTHALMOLOGY

## 2017-11-27 ASSESSMENT — EXTERNAL EXAM - LEFT EYE: OS_EXAM: NORMAL

## 2017-11-27 ASSESSMENT — TONOMETRY
IOP_METHOD: TONOPEN
OS_IOP_MMHG: 17
OD_IOP_MMHG: 14

## 2017-11-27 ASSESSMENT — VISUAL ACUITY
OS_SC: 20/40
OD_SC: 20/30
METHOD: SNELLEN - LINEAR
OD_SC+: -2

## 2017-11-27 ASSESSMENT — SLIT LAMP EXAM - LIDS
COMMENTS: NORMAL
COMMENTS: NORMAL

## 2017-11-27 ASSESSMENT — EXTERNAL EXAM - RIGHT EYE: OD_EXAM: NORMAL

## 2017-11-27 NOTE — MR AVS SNAPSHOT
After Visit Summary   2017    Edmund Drew    MRN: [id number]           Patient Information     Date Of Birth          1980        Visit Information        Provider Department      2017 1:00 PM Omari Walden MD [facility]        Today's Diagnoses     Pyogenic granuloma of conjunctiva, left    -  1       Follow-ups after your visit        Who to contact     If you have questions or need follow up information about today's clinic visit or your schedule please contact [facility] directly at [phone].  Normal or non-critical lab and imaging results will be communicated to you by Spriohart, letter or phone within 4 business days after the clinic has received the results. If you do not hear from us within 7 days, please contact the clinic through Spriohart or phone. If you have a critical or abnormal lab result, we will notify you by phone as soon as possible.  Submit refill requests through Resultly or call your pharmacy and they will forward the refill request to us. Please allow 3 business days for your refill to be completed.          Additional Information About Your Visit        MyChart Information     Resultly is an electronic gateway that provides easy, online access to your medical records. With Resultly, you can request a clinic appointment, read your test results, renew a prescription or communicate with your care team.     To sign up for Resultly visit the website at [email or web address]   You will be asked to enter the access code listed below, as well as some personal information. Please follow the directions to create your username and password.     Your access code is: [email or web address]  Expires: 2018 10:56 AM     Your access code will  in 90 days. If you need help or a new code, please contact your [facility] or call [phone] for assistance.        Care EveryWhere ID     This is  your Care EveryWhere ID. This could be used by other organizations to access your [facility] medical records  [id number]         Blood Pressure from Last 3 Encounters:   10/10/17 104/62   06/07/17 106/62   04/12/17 108/72    Weight from Last 3 Encounters:   10/10/17 95.7 kg (211 lb)   06/07/17 96.2 kg (212 lb)   04/12/17 94.8 kg (209 lb)              We Performed the Following     EYE EXAM ESTABLISHED PT        Primary Care Provider Office Phone # Fax #    Fred Montes, [phone]       [address] [address]        Equal Access to Services     BETTY JURADO : Hadii aad ku hadasho Soomaali, waaxda luqadaha, qaybta kaalmada adeegyada, waxay roein cassie page . So [facility] [phone].    ATENCIÓN: Si habla español, tiene a dalton disposición servicios gratuitos de asistencia lingüística. Llame al [phone].    We comply with applicable federal civil rights laws and [place] laws. We do not discriminate on the basis of race, color, national origin, age, disability, sex, sexual orientation, or gender identity.            Thank you!     Thank you for choosing [facility]  for your care. Our goal is always to provide you with excellent care. Hearing back from our patients is one way we can continue to improve our services. Please take a few minutes to complete the written survey that you may receive in the mail after your visit with us. Thank you!             Your Updated Medication List - Protect others around you: Learn how to safely use, store and throw away your medicines at www.disposemymeds.org.          This list is accurate as of: 11/27/17  1:24 PM.  Always use your most recent med list.                   Brand Name Dispense Instructions for use Diagnosis    order for DME      Equipment ordered: RESMED CPAP Mask type: Nasal Settings: 9 cm        tobramycin-dexamethasone ophthalmic ointment    TOBRADEX    1 Tube    Place 0.25  inches Into the left eye 4 times daily    Pyogenic granuloma of conjunctiva, left

## 2017-11-27 NOTE — NURSING NOTE
Patient presents with:  RECHECK: after removal of Pyogenic granuloma of conjunctiva OS      Referring Provider:  No referring provider defined for this encounter.    HPI    Affected eye(s):  Left   Symptoms:              Comments:  Pt doing fine.  OS comfortable.  Pt currently using Tobradex QID OS.

## 2017-11-27 NOTE — PROGRESS NOTES
Assessment & Plan   Edmund Drew is a 37 year old male who presents with:   Review of systems for the eyes was negative other than the pertinent positives and negatives noted in the HPI.  History is obtained from the patient.    Pyogenic granuloma of conjunctiva, left  - Resolved  - Stop gtts and ointment    Return as needed    Documentation for today's encounter was performed by Shirin HARRIS. CECIL. Acting as a scribe in my presence. I have reviewed and verified that it is an accurate recording of today's encounter.    Attending Physician Attestation:  Complete documentation of historical and exam elements from today's encounter can be found in the full encounter summary report (not reduplicated in this progress note).  I personally obtained the chief complaint(s) and history of present illness.  I confirmed and edited as necessary the review of systems, past medical/surgical history, family history, social history, and examination findings as documented by others; and I examined the patient myself.  I personally reviewed the relevant tests, images, and reports as documented above.  I formulated and edited as necessary the assessment and plan and discussed the findings and management plan with the patient and family. - Omari Walden MD

## 2019-09-25 ENCOUNTER — OFFICE VISIT (OUTPATIENT)
Dept: FAMILY MEDICINE | Facility: CLINIC | Age: 39
End: 2019-09-25
Payer: COMMERCIAL

## 2019-09-25 VITALS
TEMPERATURE: 98.1 F | WEIGHT: 208 LBS | BODY MASS INDEX: 32.65 KG/M2 | OXYGEN SATURATION: 97 % | HEART RATE: 65 BPM | HEIGHT: 67 IN | DIASTOLIC BLOOD PRESSURE: 78 MMHG | RESPIRATION RATE: 18 BRPM | SYSTOLIC BLOOD PRESSURE: 124 MMHG

## 2019-09-25 DIAGNOSIS — Z23 ENCOUNTER FOR IMMUNIZATION: ICD-10-CM

## 2019-09-25 DIAGNOSIS — R10.31 RIGHT LOWER QUADRANT PAIN: Primary | ICD-10-CM

## 2019-09-25 PROCEDURE — 99214 OFFICE O/P EST MOD 30 MIN: CPT | Mod: 25 | Performed by: FAMILY MEDICINE

## 2019-09-25 PROCEDURE — 90686 IIV4 VACC NO PRSV 0.5 ML IM: CPT | Performed by: FAMILY MEDICINE

## 2019-09-25 PROCEDURE — 90471 IMMUNIZATION ADMIN: CPT | Performed by: FAMILY MEDICINE

## 2019-09-25 ASSESSMENT — MIFFLIN-ST. JEOR: SCORE: 1828.14

## 2019-09-25 ASSESSMENT — PAIN SCALES - GENERAL: PAINLEVEL: SEVERE PAIN (6)

## 2019-09-25 NOTE — PROGRESS NOTES
Subjective     Edmund Drew is a 38 year old male who presents to clinic today for the following health issues:    HPI   ABDOMINAL   PAIN     Onset: 3 months    Description:   Character: Sharp  Location: right lower quadrant  Radiation: None    Intensity: mild    Progression of Symptoms:  intermittent    Accompanying Signs & Symptoms:  Fever/Chills?: no   Gas/Bloating: no   Nausea: no   Vomitting: no   Diarrhea?: no   Constipation:YES- x1 day  Dysuria or Hematuria: no    History:   Trauma: no   Previous similar pain: no    Previous tests done: none    Precipitating factors:   Does the pain change with:   YES walking    Food: no      BM: no     Urination: no     Alleviating factors:  none    Therapies Tried and outcome: Ibuprofen tried and failed    LMP:  not applicable       Patient Active Problem List   Diagnosis     LOUIS (obstructive sleep apnea)- severe (AHI 69)     CARDIOVASCULAR SCREENING; LDL GOAL LESS THAN 160     Obesity, Class I, BMI 30-34.9     Family history of diabetes mellitus     Erectile dysfunction, unspecified erectile dysfunction type     Pyogenic granuloma of left conjunctiva     Episcleritis of left eye     Past Surgical History:   Procedure Laterality Date     CARPAL TUNNEL RELEASE RT/LT Right 2015    [facility]     CARPAL TUNNEL RELEASE RT/LT Right 2015       Social History     Tobacco Use     Smoking status: Never Smoker     Smokeless tobacco: Never Used   Substance Use Topics     Alcohol use: No     Comment: occasional, 1-2 beers     Family History   Problem Relation Age of Onset     Diabetes Mother      Diabetes Sister 37     Coronary Artery Disease No family hx of      Colon Cancer No family hx of      Hypertension No family hx of      Cerebrovascular Disease No family hx of      Cancer No family hx of            Reviewed and updated as needed this visit by Provider         Review of Systems   ROS COMP: Constitutional, HEENT, cardiovascular, pulmonary, GI, , musculoskeletal,  "neuro, skin, endocrine and psych systems are negative, except as otherwise noted.      Objective    /78 (BP Location: Left arm, Patient Position: Chair, Cuff Size: Adult Large)   Pulse 65   Temp 98.1  F (36.7  C) (Oral)   Resp 18   Ht 1.695 m (5' 6.75\")   Wt 94.3 kg (208 lb)   SpO2 97%   BMI 32.82 kg/m    Body mass index is 32.82 kg/m .  Physical Exam   GENERAL: healthy, alert and no distress  NECK: no adenopathy, no asymmetry, masses, or scars and thyroid normal to palpation  RESP: lungs clear to auscultation - no rales, rhonchi or wheezes  CV: regular rate and rhythm, normal S1 S2, no S3 or S4, no murmur, click or rub, no peripheral edema and peripheral pulses strong  ABDOMEN: soft, RLQ tenderness, no r/r/g, no hepatosplenomegaly, no masses and bowel sounds normal  MS: no gross musculoskeletal defects noted, no edema    Diagnostic Test Results:  Labs reviewed in Epic        Assessment & Plan     1. Right lower quadrant pain  Tender on exam. Muscular strain? R/o hernia. Doubt appendicitis. Colitis? CA? Await CT scan.  - CT Abdomen Pelvis w/o Contrast; Future    2. Encounter for immunization    - HC FLU VAC PRESRV FREE QUAD SPLIT VIR 3+YRS IM  [22218]  -      ADMIN VACCINE, FIRST [[id number]]     BMI:   Estimated body mass index is 32.82 kg/m  as calculated from the following:    Height as of this encounter: 1.695 m (5' 6.75\").    Weight as of this encounter: 94.3 kg (208 lb).           See Patient Instructions    Return in about 4 weeks (around 10/23/2019) for as needed.    Maldonado Barton MD, MD  [facility]    "

## 2020-03-25 ENCOUNTER — OFFICE VISIT (OUTPATIENT)
Dept: URGENT CARE | Facility: URGENT CARE | Age: 40
End: 2020-03-25
Payer: COMMERCIAL

## 2020-03-25 VITALS
BODY MASS INDEX: 33.07 KG/M2 | RESPIRATION RATE: 24 BRPM | DIASTOLIC BLOOD PRESSURE: 78 MMHG | SYSTOLIC BLOOD PRESSURE: 118 MMHG | TEMPERATURE: 98.6 F | WEIGHT: 209.6 LBS | OXYGEN SATURATION: 97 % | HEART RATE: 76 BPM

## 2020-03-25 DIAGNOSIS — M62.838 MUSCLE SPASM: Primary | ICD-10-CM

## 2020-03-25 DIAGNOSIS — S16.1XXA STRAIN OF NECK MUSCLE, INITIAL ENCOUNTER: ICD-10-CM
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
## 2020-03-25 PROCEDURE — 99214 OFFICE O/P EST MOD 30 MIN: CPT | Mod: 25 | Performed by: PHYSICIAN ASSISTANT

## 2020-03-25 RX ORDER — CYCLOBENZAPRINE HCL 10 MG
10 TABLET ORAL 3 TIMES DAILY PRN
Qty: 40 TABLET | Refills: 0 | Status: SHIPPED | OUTPATIENT
Start: 2020-03-25

## 2020-03-25 RX ORDER — NAPROXEN 500 MG/1
500 TABLET ORAL 2 TIMES DAILY WITH MEALS
Qty: 90 TABLET | Refills: 1 | Status: SHIPPED | OUTPATIENT
Start: 2020-03-25

## 2020-03-25 ASSESSMENT — ENCOUNTER SYMPTOMS
CARDIOVASCULAR NEGATIVE: 1
DYSURIA: 0
COUGH: 0
NEUROLOGICAL NEGATIVE: 1
EYE DISCHARGE: 0
ENDOCRINE NEGATIVE: 1
WEAKNESS: 0
CONSTITUTIONAL NEGATIVE: 1
EYE REDNESS: 0
EYES NEGATIVE: 1
LIGHT-HEADEDNESS: 0
DIAPHORESIS: 0
SORE THROAT: 0
DIZZINESS: 0
CHEST TIGHTNESS: 0
FREQUENCY: 0
PALPITATIONS: 0
NAUSEA: 0
GASTROINTESTINAL NEGATIVE: 1
CHILLS: 0
HEMATURIA: 0
WHEEZING: 0
EYE ITCHING: 0
MYALGIAS: 0
BACK PAIN: 1
HEADACHES: 0
FEVER: 0
RHINORRHEA: 0
DIARRHEA: 0
VOMITING: 0
SHORTNESS OF BREATH: 0
ABDOMINAL PAIN: 0
RESPIRATORY NEGATIVE: 1
POLYDIPSIA: 0
NECK PAIN: 1
ADENOPATHY: 0

## 2020-03-25 ASSESSMENT — PAIN SCALES - GENERAL: PAINLEVEL: MODERATE PAIN (5)

## 2020-03-25 NOTE — PATIENT INSTRUCTIONS
Patient Education     Muscle Spasm  A muscle spasm is a sudden tightening of the muscle you can t control. This may be caused by strain, overworking the muscle, or injury. It can also be caused by dehydration, electrolyte imbalance, diabetes, alcohol use, and certain medicines. If it goes on long enough the muscle spasm causes pain. Common areas for muscle spasm are the legs, neck, and back.  Home care    Heat, massage, and stretching will help relax muscle spasm.    When the spasm is in your arm or leg, stretch the muscle passively. To do this, have someone bend or straighten the joint above or below the muscle until you feel the stretch on the sore muscle. You can stretch the muscle actively by moving the affected body part. This will stretch the muscle that is in spasm. For example, if the spasm is in your calf, bend the ankle so your toes point upward toward your knee. This will stretch your calf muscle.    You may use over-the-counter pain medicine to control pain, unless another medicine was prescribed. If you have chronic liver or kidney disease or ever had a stomach ulcer or gastrointestinal bleeding, talk with your healthcare provider before using these medicines.    Follow-up care  Follow up with your healthcare provider, or as advised.    When to seek medical advice  Call your healthcare provider right away if any of the following occur:    Fingers or toes become swollen, cold, blue, numb, or tingly    You develop weakness in the affected arm or leg    Pain increases and is not controlled by the above measures  Date Last Reviewed: 5/1/2018 2000-2019 [employer]. [address] [place] [address]. All rights reserved. This information is not intended as a substitute for professional medical care. Always follow your healthcare professional's instructions.           Patient Education     Neck Sprain or Strain  A sudden force that causes turning or bending of the neck can cause  sprain or strain. An example would be the force from a car accident. This can stretch or tear muscles called a strain. It can also stretch or tear ligaments called a sprain. Either of these can cause neck pain. Sometimes neck pain occurs after a simple awkward movement. In either case, muscle spasm is commonly present and contributes to the pain.   Unless you had a forceful physical injury (for example, a car accident or fall), X-rays are often not ordered for the initial evaluation of neck pain. If pain continues and does not respond to medical treatment, X-rays and other tests may be done later.  Home care    You may feel more soreness and spasm the first few days after the injury. Rest until symptoms start to improve.    When lying down, use a comfortable pillow or a rolled towel that supports the head and keeps the spine in a neutral position. The position of the head should not be tilted forward or backward.    Apply an ice pack over the injured area for 15 to 20 minutes every 3 to 6 hours. Do this for the first 24 to 48 hours. You can make an ice pack by filling a plastic bag that seals at the top with ice cubes and then wrapping it with a thin towel. After 48 hours, apply heat (warm shower or warm bath) for 15 to 20 minutes several times a day, or alternate ice and heat.    You may use over-the-counter pain medicine to control pain, unless another pain medicine was prescribed. If you have chronic liver or kidney disease or ever had a stomach ulcer or gastrointestinal bleeding, talk with your healthcare provider before using these medicines.    If a soft cervical collar was prescribed, only ear it for periods of increased pain. It should not be worn for more than 3 hours a day, or for longer than 1 to 2 weeks.  Follow-up care  Follow up with your healthcare provider, or as directed. Physical therapy may be needed.  Sometimes fractures don t show up on the first X-ray. Bruises and sprains can sometimes hurt as  much as a fracture. These injuries can take time to heal completely. If your symptoms don t improve or they get worse, talk with your healthcare provider. You may need a repeat X-ray or other tests. If X-rays were taken, you will be told of any new findings that may affect your care.  Call 911  Call 911 if you have:    Neck swelling, difficulty or painful swallowing    Trouble breathing    Chest pain  When to seek medical advice  Call your healthcare provider right away if any of these occur:    Pain becomes worse or spreads into your arms or legs    Weakness or numbness in one or both arms or legs  Date Last Reviewed: 5/1/2018 2000-2019 [employer]. [address] [place] [address], [place] [address]. All rights reserved. This information is not intended as a substitute for professional medical care. Always follow your healthcare professional's instructions.
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
